# Patient Record
Sex: FEMALE | Race: WHITE | NOT HISPANIC OR LATINO
[De-identification: names, ages, dates, MRNs, and addresses within clinical notes are randomized per-mention and may not be internally consistent; named-entity substitution may affect disease eponyms.]

---

## 2017-04-11 ENCOUNTER — APPOINTMENT (OUTPATIENT)
Dept: ORTHOPEDIC SURGERY | Facility: CLINIC | Age: 77
End: 2017-04-11

## 2017-04-11 VITALS — WEIGHT: 170 LBS | HEIGHT: 64 IN | BODY MASS INDEX: 29.02 KG/M2

## 2017-04-20 RX ORDER — HYALURONATE SOD, CROSS-LINKED 30 MG/3 ML
30 SYRINGE (ML) INTRAARTICULAR
Refills: 0 | Status: COMPLETED | OUTPATIENT
Start: 2017-04-20

## 2017-04-20 RX ADMIN — Medication 0 MG/3ML: at 00:00

## 2017-12-18 ENCOUNTER — APPOINTMENT (OUTPATIENT)
Dept: DERMATOLOGY | Facility: CLINIC | Age: 77
End: 2017-12-18
Payer: MEDICARE

## 2017-12-18 DIAGNOSIS — Z85.828 PERSONAL HISTORY OF OTHER MALIGNANT NEOPLASM OF SKIN: ICD-10-CM

## 2017-12-18 PROCEDURE — 99213 OFFICE O/P EST LOW 20 MIN: CPT | Mod: 25

## 2017-12-18 PROCEDURE — 11100 BX SKIN SUBCUTANEOUS&/MUCOUS MEMBRANE 1 LESION: CPT | Mod: 59

## 2017-12-18 PROCEDURE — 17110 DESTRUCTION B9 LES UP TO 14: CPT

## 2018-01-03 LAB — CORE LAB BIOPSY: NORMAL

## 2018-02-22 ENCOUNTER — APPOINTMENT (OUTPATIENT)
Dept: DERMATOLOGY | Facility: CLINIC | Age: 78
End: 2018-02-22
Payer: MEDICARE

## 2018-02-22 PROCEDURE — 12032 INTMD RPR S/A/T/EXT 2.6-7.5: CPT

## 2018-02-22 PROCEDURE — 11603 EXC TR-EXT MAL+MARG 2.1-3 CM: CPT

## 2018-02-28 LAB — CORE LAB BIOPSY: NORMAL

## 2018-03-05 ENCOUNTER — APPOINTMENT (OUTPATIENT)
Dept: DERMATOLOGY | Facility: CLINIC | Age: 78
End: 2018-03-05
Payer: MEDICARE

## 2018-03-05 DIAGNOSIS — L85.8 OTHER SPECIFIED EPIDERMAL THICKENING: ICD-10-CM

## 2018-03-05 PROCEDURE — 99212 OFFICE O/P EST SF 10 MIN: CPT

## 2018-03-05 RX ORDER — NITROFURANTOIN MACROCRYSTALS 100 MG/1
100 CAPSULE ORAL
Qty: 6 | Refills: 0 | Status: COMPLETED | COMMUNITY
Start: 2017-09-20

## 2018-03-05 RX ORDER — NITROFURANTOIN (MONOHYDRATE/MACROCRYSTALS) 25; 75 MG/1; MG/1
100 CAPSULE ORAL
Qty: 20 | Refills: 0 | Status: DISCONTINUED | COMMUNITY
Start: 2017-11-20

## 2018-03-05 RX ORDER — ERYTHROMYCIN 5 MG/G
5 OINTMENT OPHTHALMIC
Qty: 4 | Refills: 0 | Status: COMPLETED | COMMUNITY
Start: 2017-10-19

## 2018-04-05 ENCOUNTER — APPOINTMENT (OUTPATIENT)
Dept: DERMATOLOGY | Facility: CLINIC | Age: 78
End: 2018-04-05
Payer: MEDICARE

## 2018-04-05 DIAGNOSIS — T14.8XXA OTHER INJURY OF UNSPECIFIED BODY REGION, INITIAL ENCOUNTER: ICD-10-CM

## 2018-04-05 DIAGNOSIS — L02.91 CUTANEOUS ABSCESS, UNSPECIFIED: ICD-10-CM

## 2018-04-05 PROCEDURE — 99213 OFFICE O/P EST LOW 20 MIN: CPT

## 2018-04-05 RX ORDER — AZITHROMYCIN 250 MG/1
250 TABLET, FILM COATED ORAL
Qty: 6 | Refills: 0 | Status: COMPLETED | COMMUNITY
Start: 2018-03-25

## 2018-04-09 LAB — BACTERIA SPEC CULT: ABNORMAL

## 2018-09-13 ENCOUNTER — APPOINTMENT (OUTPATIENT)
Dept: DERMATOLOGY | Facility: CLINIC | Age: 78
End: 2018-09-13
Payer: MEDICARE

## 2018-09-13 VITALS — WEIGHT: 166 LBS | BODY MASS INDEX: 28.34 KG/M2 | HEIGHT: 64 IN

## 2018-09-13 DIAGNOSIS — Z85.89 PERSONAL HISTORY OF MALIGNANT NEOPLASM OF OTHER ORGANS AND SYSTEMS: ICD-10-CM

## 2018-09-13 DIAGNOSIS — D18.01 HEMANGIOMA OF SKIN AND SUBCUTANEOUS TISSUE: ICD-10-CM

## 2018-09-13 PROCEDURE — 99213 OFFICE O/P EST LOW 20 MIN: CPT

## 2018-09-13 PROCEDURE — D0051: CPT

## 2018-09-13 RX ORDER — NITROFURANTOIN (MONOHYDRATE/MACROCRYSTALS) 25; 75 MG/1; MG/1
100 CAPSULE ORAL
Qty: 14 | Refills: 0 | Status: COMPLETED | COMMUNITY
Start: 2018-08-04 | End: 2018-09-13

## 2019-03-18 ENCOUNTER — APPOINTMENT (OUTPATIENT)
Dept: DERMATOLOGY | Facility: CLINIC | Age: 79
End: 2019-03-18

## 2019-10-03 ENCOUNTER — APPOINTMENT (OUTPATIENT)
Dept: DERMATOLOGY | Facility: CLINIC | Age: 79
End: 2019-10-03
Payer: MEDICARE

## 2019-10-03 VITALS — BODY MASS INDEX: 28.51 KG/M2 | HEIGHT: 64 IN | WEIGHT: 167 LBS

## 2019-10-03 PROCEDURE — 17000 DESTRUCT PREMALG LESION: CPT

## 2019-10-03 PROCEDURE — 99213 OFFICE O/P EST LOW 20 MIN: CPT | Mod: 25

## 2019-10-03 PROCEDURE — 17003 DESTRUCT PREMALG LES 2-14: CPT

## 2020-05-06 ENCOUNTER — APPOINTMENT (OUTPATIENT)
Dept: DERMATOLOGY | Facility: CLINIC | Age: 80
End: 2020-05-06

## 2021-05-25 ENCOUNTER — APPOINTMENT (OUTPATIENT)
Dept: CARDIOLOGY | Facility: CLINIC | Age: 81
End: 2021-05-25
Payer: MEDICARE

## 2021-05-25 ENCOUNTER — NON-APPOINTMENT (OUTPATIENT)
Age: 81
End: 2021-05-25

## 2021-05-25 VITALS — DIASTOLIC BLOOD PRESSURE: 78 MMHG | HEART RATE: 66 BPM | SYSTOLIC BLOOD PRESSURE: 166 MMHG | RESPIRATION RATE: 18 BRPM

## 2021-05-25 LAB
INR PPP: 2 RATIO
POCT-PROTHROMBIN TIME: 23.5 SECS
QUALITY CONTROL: YES

## 2021-05-25 PROCEDURE — 85610 PROTHROMBIN TIME: CPT | Mod: QW

## 2021-05-25 PROCEDURE — 99213 OFFICE O/P EST LOW 20 MIN: CPT

## 2021-06-01 ENCOUNTER — APPOINTMENT (OUTPATIENT)
Dept: CARDIOLOGY | Facility: CLINIC | Age: 81
End: 2021-06-01
Payer: MEDICARE

## 2021-06-01 VITALS — HEART RATE: 75 BPM | RESPIRATION RATE: 18 BRPM | SYSTOLIC BLOOD PRESSURE: 160 MMHG | DIASTOLIC BLOOD PRESSURE: 66 MMHG

## 2021-06-01 LAB
INR PPP: 2.8 RATIO
POCT-PROTHROMBIN TIME: 33.7 SECS
QUALITY CONTROL: YES

## 2021-06-01 PROCEDURE — 93793 ANTICOAG MGMT PT WARFARIN: CPT

## 2021-06-01 PROCEDURE — 85610 PROTHROMBIN TIME: CPT | Mod: QW

## 2021-06-17 ENCOUNTER — APPOINTMENT (OUTPATIENT)
Dept: CARDIOLOGY | Facility: CLINIC | Age: 81
End: 2021-06-17
Payer: MEDICARE

## 2021-06-17 VITALS — HEART RATE: 76 BPM | DIASTOLIC BLOOD PRESSURE: 82 MMHG | SYSTOLIC BLOOD PRESSURE: 156 MMHG | RESPIRATION RATE: 18 BRPM

## 2021-06-17 LAB
INR PPP: 1.7 RATIO
POCT-PROTHROMBIN TIME: 20 SECS
QUALITY CONTROL: YES

## 2021-06-17 PROCEDURE — 93793 ANTICOAG MGMT PT WARFARIN: CPT

## 2021-06-17 PROCEDURE — 85610 PROTHROMBIN TIME: CPT | Mod: QW

## 2021-06-22 ENCOUNTER — APPOINTMENT (OUTPATIENT)
Dept: CARDIOLOGY | Facility: CLINIC | Age: 81
End: 2021-06-22
Payer: MEDICARE

## 2021-06-22 PROCEDURE — 85610 PROTHROMBIN TIME: CPT | Mod: QW

## 2021-06-22 PROCEDURE — 93793 ANTICOAG MGMT PT WARFARIN: CPT

## 2021-06-30 ENCOUNTER — APPOINTMENT (OUTPATIENT)
Dept: CARDIOLOGY | Facility: CLINIC | Age: 81
End: 2021-06-30
Payer: MEDICARE

## 2021-06-30 VITALS — RESPIRATION RATE: 18 BRPM | DIASTOLIC BLOOD PRESSURE: 69 MMHG | SYSTOLIC BLOOD PRESSURE: 151 MMHG | HEART RATE: 76 BPM

## 2021-06-30 LAB
INR PPP: 3.3 RATIO
POCT-PROTHROMBIN TIME: 40.2 SECS
QUALITY CONTROL: YES

## 2021-06-30 PROCEDURE — 93793 ANTICOAG MGMT PT WARFARIN: CPT

## 2021-06-30 PROCEDURE — 85610 PROTHROMBIN TIME: CPT | Mod: QW

## 2021-07-01 LAB
INR PPP: 2.4 RATIO
POCT-PROTHROMBIN TIME: 28.4 SECS
QUALITY CONTROL: YES

## 2021-07-09 ENCOUNTER — APPOINTMENT (OUTPATIENT)
Dept: CARDIOLOGY | Facility: CLINIC | Age: 81
End: 2021-07-09
Payer: MEDICARE

## 2021-07-09 VITALS — RESPIRATION RATE: 18 BRPM | SYSTOLIC BLOOD PRESSURE: 132 MMHG | HEART RATE: 68 BPM | DIASTOLIC BLOOD PRESSURE: 77 MMHG

## 2021-07-09 LAB
INR PPP: 3.8 RATIO
POCT-PROTHROMBIN TIME: 45.8 SECS
QUALITY CONTROL: YES

## 2021-07-09 PROCEDURE — 93793 ANTICOAG MGMT PT WARFARIN: CPT

## 2021-07-09 PROCEDURE — 85610 PROTHROMBIN TIME: CPT | Mod: QW

## 2021-07-16 ENCOUNTER — APPOINTMENT (OUTPATIENT)
Dept: CARDIOLOGY | Facility: CLINIC | Age: 81
End: 2021-07-16
Payer: MEDICARE

## 2021-07-16 VITALS — DIASTOLIC BLOOD PRESSURE: 69 MMHG | SYSTOLIC BLOOD PRESSURE: 154 MMHG | HEART RATE: 70 BPM | RESPIRATION RATE: 18 BRPM

## 2021-07-16 PROCEDURE — 85610 PROTHROMBIN TIME: CPT | Mod: QW

## 2021-07-16 PROCEDURE — 93793 ANTICOAG MGMT PT WARFARIN: CPT

## 2021-07-19 LAB
INR PPP: 3.5 RATIO
POCT-PROTHROMBIN TIME: 38.5 SECS
QUALITY CONTROL: YES

## 2021-07-23 ENCOUNTER — APPOINTMENT (OUTPATIENT)
Dept: CARDIOLOGY | Facility: CLINIC | Age: 81
End: 2021-07-23
Payer: MEDICARE

## 2021-07-23 VITALS — HEART RATE: 67 BPM | DIASTOLIC BLOOD PRESSURE: 68 MMHG | RESPIRATION RATE: 18 BRPM | SYSTOLIC BLOOD PRESSURE: 142 MMHG

## 2021-07-23 LAB
INR PPP: 2.3 RATIO
POCT-PROTHROMBIN TIME: 27 SECS
QUALITY CONTROL: YES

## 2021-07-23 PROCEDURE — 93793 ANTICOAG MGMT PT WARFARIN: CPT

## 2021-07-23 PROCEDURE — 85610 PROTHROMBIN TIME: CPT | Mod: QW

## 2021-08-03 ENCOUNTER — APPOINTMENT (OUTPATIENT)
Dept: CARDIOLOGY | Facility: CLINIC | Age: 81
End: 2021-08-03
Payer: MEDICARE

## 2021-08-03 LAB
INR PPP: 2.8 RATIO
POCT-PROTHROMBIN TIME: 33.8 SECS
QUALITY CONTROL: YES

## 2021-08-03 PROCEDURE — 93793 ANTICOAG MGMT PT WARFARIN: CPT

## 2021-08-03 PROCEDURE — 85610 PROTHROMBIN TIME: CPT | Mod: QW

## 2021-08-24 ENCOUNTER — APPOINTMENT (OUTPATIENT)
Dept: CARDIOLOGY | Facility: CLINIC | Age: 81
End: 2021-08-24
Payer: MEDICARE

## 2021-08-24 VITALS — RESPIRATION RATE: 18 BRPM | HEART RATE: 65 BPM | DIASTOLIC BLOOD PRESSURE: 67 MMHG | SYSTOLIC BLOOD PRESSURE: 149 MMHG

## 2021-08-24 LAB
INR PPP: 2.4 RATIO
POCT-PROTHROMBIN TIME: 28.3 SECS
QUALITY CONTROL: YES

## 2021-08-24 PROCEDURE — 93793 ANTICOAG MGMT PT WARFARIN: CPT

## 2021-08-24 PROCEDURE — 85610 PROTHROMBIN TIME: CPT | Mod: QW

## 2021-09-14 ENCOUNTER — APPOINTMENT (OUTPATIENT)
Dept: CARDIOLOGY | Facility: CLINIC | Age: 81
End: 2021-09-14
Payer: MEDICARE

## 2021-09-14 VITALS — DIASTOLIC BLOOD PRESSURE: 60 MMHG | SYSTOLIC BLOOD PRESSURE: 149 MMHG | HEART RATE: 60 BPM | RESPIRATION RATE: 18 BRPM

## 2021-09-14 PROCEDURE — 85610 PROTHROMBIN TIME: CPT | Mod: QW

## 2021-09-14 PROCEDURE — 93793 ANTICOAG MGMT PT WARFARIN: CPT

## 2021-09-18 LAB
INR PPP: 2.2 RATIO
POCT-PROTHROMBIN TIME: 26 SECS
QUALITY CONTROL: YES

## 2021-10-05 ENCOUNTER — APPOINTMENT (OUTPATIENT)
Dept: CARDIOLOGY | Facility: CLINIC | Age: 81
End: 2021-10-05
Payer: MEDICARE

## 2021-10-05 VITALS — DIASTOLIC BLOOD PRESSURE: 66 MMHG | SYSTOLIC BLOOD PRESSURE: 155 MMHG | HEART RATE: 75 BPM | RESPIRATION RATE: 18 BRPM

## 2021-10-05 LAB
INR PPP: 3.1 RATIO
POCT-PROTHROMBIN TIME: 37.2 SECS
QUALITY CONTROL: YES

## 2021-10-05 PROCEDURE — 93793 ANTICOAG MGMT PT WARFARIN: CPT

## 2021-10-05 PROCEDURE — 85610 PROTHROMBIN TIME: CPT | Mod: QW

## 2021-10-17 ENCOUNTER — TRANSCRIPTION ENCOUNTER (OUTPATIENT)
Age: 81
End: 2021-10-17

## 2021-10-18 ENCOUNTER — NON-APPOINTMENT (OUTPATIENT)
Age: 81
End: 2021-10-18

## 2021-10-18 ENCOUNTER — APPOINTMENT (OUTPATIENT)
Dept: ORTHOPEDIC SURGERY | Facility: CLINIC | Age: 81
End: 2021-10-18
Payer: MEDICARE

## 2021-10-18 ENCOUNTER — EMERGENCY (EMERGENCY)
Facility: HOSPITAL | Age: 81
LOS: 0 days | Discharge: HOME | End: 2021-10-18
Attending: EMERGENCY MEDICINE
Payer: MEDICARE

## 2021-10-18 VITALS — WEIGHT: 166.01 LBS | HEIGHT: 64 IN

## 2021-10-18 VITALS
HEART RATE: 78 BPM | RESPIRATION RATE: 19 BRPM | SYSTOLIC BLOOD PRESSURE: 173 MMHG | OXYGEN SATURATION: 100 % | TEMPERATURE: 99 F | DIASTOLIC BLOOD PRESSURE: 79 MMHG

## 2021-10-18 DIAGNOSIS — Z88.8 ALLERGY STATUS TO OTHER DRUGS, MEDICAMENTS AND BIOLOGICAL SUBSTANCES: ICD-10-CM

## 2021-10-18 DIAGNOSIS — S82.841A DISPLACED BIMALLEOLAR FRACTURE OF RIGHT LOWER LEG, INITIAL ENCOUNTER FOR CLOSED FRACTURE: ICD-10-CM

## 2021-10-18 DIAGNOSIS — Z91.040 LATEX ALLERGY STATUS: ICD-10-CM
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
## 2021-10-18 DIAGNOSIS — M79.671 PAIN IN RIGHT FOOT: ICD-10-CM
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
## 2021-10-18 PROCEDURE — 76376 3D RENDER W/INTRP POSTPROCES: CPT

## 2021-10-18 PROCEDURE — 29515 APPLICATION SHORT LEG SPLINT: CPT

## 2021-10-18 PROCEDURE — 73700 CT LOWER EXTREMITY W/O DYE: CPT | Mod: 26,RT,MG

## 2021-10-18 PROCEDURE — 27810 TREATMENT OF ANKLE FRACTURE: CPT | Mod: RT

## 2021-10-18 PROCEDURE — 73610 X-RAY EXAM OF ANKLE: CPT | Mod: 26,RT

## 2021-10-18 PROCEDURE — G1004: CPT

## 2021-10-18 PROCEDURE — 73700 CT LOWER EXTREMITY W/O DYE: CPT | Mod: MG,RT

## 2021-10-18 PROCEDURE — 99282 EMERGENCY DEPT VISIT SF MDM: CPT | Mod: 25

## 2021-10-18 PROCEDURE — 73590 X-RAY EXAM OF LOWER LEG: CPT | Mod: 26,RT

## 2021-10-18 PROCEDURE — 73590 X-RAY EXAM OF LOWER LEG: CPT | Mod: RT

## 2021-10-18 PROCEDURE — 76376 3D RENDER W/INTRP POSTPROCES: CPT | Mod: 26

## 2021-10-18 PROCEDURE — 73610 X-RAY EXAM OF ANKLE: CPT | Mod: RT

## 2021-10-18 PROCEDURE — 73600 X-RAY EXAM OF ANKLE: CPT | Mod: 26,59,76,RT

## 2021-10-18 PROCEDURE — 99204 OFFICE O/P NEW MOD 45 MIN: CPT

## 2021-10-18 PROCEDURE — 73600 X-RAY EXAM OF ANKLE: CPT | Mod: RT

## 2021-10-18 PROCEDURE — 99284 EMERGENCY DEPT VISIT MOD MDM: CPT

## 2021-10-18 PROCEDURE — 99285 EMERGENCY DEPT VISIT HI MDM: CPT | Mod: 25

## 2021-10-18 NOTE — PHYSICAL EXAM
[de-identified] : Right ankle physical exam:\par \par Very limited right ankle physical exam.  I made an anterior window through the ankle splint.  No fracture blisters present.  Ankle swelling noted.  Pain on the medial and lateral malleoli.  Dorsalis pedis pulses normal.  Patient able to wiggle their toes.  Patient has no numbness or sensation deficits within the foot. [de-identified] : X-rays of the right ankle taken yesterday at the [facility] urgent care and reviewed: Displaced bimalleolar fracture.

## 2021-10-18 NOTE — HISTORY OF PRESENT ILLNESS
[FreeTextEntry1] : 10/18/2021: The patient is a 81-year-old female who presents in office with a right ankle bimalleolar fracture diagnosed yesterday at the [facility] urgent care.  She presents with a posterior ankle splint in a wheelchair nonweightbearing.  She does have a blood clotting disorder and is currently on warfarin daily.  Her pain scale is 5 out of 10.  She denies fevers, chills, night sweats, shortness of breath.  She has no other complaints at this time.

## 2021-10-18 NOTE — ED ADULT NURSE REASSESSMENT NOTE - NS ED NURSE REASSESS COMMENT FT1
pt refused for us to take the bp prior to dc, "I know my bp is high and I just want to go home, and I have everything at home from crutches, walker, etc"  assisted pt on WC to her car.

## 2021-10-18 NOTE — ED STATDOCS - PROGRESS NOTE DETAILS
ortho residents aware will come see pt   Lakisha Rutherford PA-C pt seen and evaluated by ortho, fx reduced, per ortho pt stable for d/c home call Dr. Montilla's office tmrw for f/u appt  Lakisha Rutherford PA-C pt nvi.  well appearing.  D/c home with strict return precautions and prompt outpatient f/u.

## 2021-10-18 NOTE — ED STATDOCS - PATIENT PORTAL LINK FT
You can access the FollowMyHealth Patient Portal offered by [facility] by registering at the following website: [email or web address]. By joining [employer]’s FollowMyHealth portal, you will also be able to view your health information using other applications (apps) compatible with our system.

## 2021-10-18 NOTE — ED STATDOCS - CARE PROVIDER_API CALL
Ricardo Montilla (DO)  Orthopaedic Surgery  [address] [place] [address]  Phone: [phone]  Fax: [phone]  Follow Up Time:

## 2021-10-18 NOTE — ED STATDOCS - NSFOLLOWUPINSTRUCTIONS_ED_ALL_ED_FT
Call Dr. Montilla's office tomorrow to make an appointment       Ankle Fracture    An ankle fracture is a break in one, two, or all three of the bones in your ankle joint. The ankle joint is made up of:  •The lower parts of your tibia and fibula. These are your lower leg bones.      •A bone in the foot called the talus.        What are the causes?    •A hard, direct hit to the ankle.      •Twisting your ankle fast and very badly.      •Getting injured in a car crash or from a fall from high up.        What increases the risk?    •Being overweight.      •Doing certain sports, such as soccer, gymnastics, or football.        What are the signs or symptoms?     •A tender and swollen ankle.      •Bruising around your ankle.      •Pain when you move or press on your ankle.      •Trouble walking.      •Trouble using your ankle to support your body weight (putting weight on your ankle).    •Pain that gets worse:   •When you move your ankle or foot.      •When you stand.        •Pain that gets better with rest.        How is this treated?  This condition may be treated with:  •A cast, boot, or splint.      •Crutches.       •Surgery.      •Staying off your injured foot.      •Exercises to make your ankle move better and get stronger.        Follow these instructions at home:    Your doctor may tell you to do these things:    If you have a boot or splint:     •Wear the boot or splint as told by your doctor. Take it off only as told by your doctor.    •Loosen it if your toes:  •Tingle.      •Lose feeling (get numb).      •Turn cold and blue.        •Keep it clean and dry.      If you have a cast:     • Do not put pressure on any part of the cast until it is fully hardened.      • Do not stick anything inside the cast to scratch your skin.      •Check the skin around the cast every day. Tell your doctor if you see problems.      •You may put lotion on dry skin around the cast. Do not put lotion on the skin under the cast.      •Keep it clean and dry.      Bathing     • Do not take baths, swim, or use a hot tub. Ask your doctor about taking showers or sponge baths.    •If the cast, splint, or boot is not waterproof:  •Do not let it get wet.      •Cover it with a watertight covering when you take a bath or shower.          Managing pain, stiffness, and swelling    •If told, put ice on the injured area. To do this:  •If you have a removable splint or boot, take it off as told by your doctor.      •Put ice in a plastic bag.      •Place a towel between your skin and the bag or between your cast and the bag.      •Leave the ice on for 20 minutes, 2–3 times a day.      •Take off the ice if your skin turns bright red. This is very important. If you cannot feel pain, heat, or cold, you have a greater risk of damage to the area.        •Move your toes often.      •Raise the injured area above the level of your heart while you are sitting or lying down.      Activity     •Do exercises as told by your doctor.      •Return to your normal activities when your doctor says that it is safe.      •Use crutches to support your body weight. Do not use your injured leg to support your body weight until your doctor says that you can.      General instructions     •Take over-the-counter and prescription medicines only as told by your doctor.      •Ask your doctor when it is safe to drive if you have a cast, boot, or splint on your leg.      • Do not smoke or use any products that contain nicotine or tobacco. These can delay bone healing. If you need help quitting, ask your doctor.      •Keep all follow-up visits.        Contact a doctor if:    •Your pain or swelling gets worse.      •Your pain or swelling does not get better with rest or medicine.      •Your cast gets damaged.        Get help right away if:    •You have very bad pain that lasts.      •You get new pain or swelling.    •Your skin or toes below the injured ankle:  •Turn blue or gray.      •Feel cold.      •Lose feeling.      •Have less feeling than normal when something touches them.          Summary    •An ankle fracture is a break in one, two, or three bones in your lower leg and foot.      •An ankle fracture may be treated with a cast, a boot, a splint, or surgery.      • Do not use your injured leg to support your body weight until your doctor says that you can.      •Use ice, take medicines, and raise your foot as told. Do not smoke or use products that contain nicotine or tobacco.      This information is not intended to replace advice given to you by your health care provider. Make sure you discuss any questions you have with your health care provider.

## 2021-10-18 NOTE — ED STATDOCS - OBJECTIVE STATEMENT
82 y/o F with no significant PMHx presents to the ED for alignment with ETHAN Ramos. States she was hiking yesterday and broke her R foot. No head strike. No LOC. No other injuries. Dr. Montilla wants a CT of the foot.

## 2021-10-18 NOTE — CONSULT NOTE ADULT - SUBJECTIVE AND OBJECTIVE BOX
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 She tripped walking up a hill in the [place] and twisted her ankle. . Pt had immediate pain to the affected ankle and was not able to bear weight. She was carried out by her friends and taken to Urgent care where she was splinted yesterday. Today she followed up w DR Montilla in the office who reviewed her films today and she was directed to come to the ED for new splint, reduction and CT for surgical planning. No other injury. Community ambulator no assistive devices Denies HS/LOC. Denies numbness/tingling. No other pain/injuries. Denies fevers/chills. Pt seen and examined along with Orthopedic PA and Resident Team on behalf DR Montilla    HEALTH ISSUES - PROBLEM Dx:        MEDICATIONS  (STANDING):    Allergies    Compazine (Unknown)  latex (Unknown)  Lovenox (Unknown)    Intolerances      Imaging: XR demonstrates Rignt ankle fracture bimalleolar            Vital Signs Last 24 Hrs  T(C): 37 (10-18-21 @ 17:40), Max: 37 (10-18-21 @ 17:40)  T(F): 98.6 (10-18-21 @ 17:40), Max: 98.6 (10-18-21 @ 17:40)  HR: 78 (10-18-21 @ 17:40) (78 - 78)  BP: 173/79 (10-18-21 @ 17:40) (173/79 - 173/79)  BP(mean): 107 (10-18-21 @ 17:40) (107 - 107)  RR: 19 (10-18-21 @ 17:40) (19 - 19)  SpO2: 100% (10-18-21 @ 17:40) (100% - 100%)    Physical Exam  Gen: Nad, Alert, oriented, Follows Commands calm and pleasant healthy looking older woman    RIGHT LE: Splint taken down. No open skin. No blisters. +swelling and mild ecchymosis of ankle. +DP pulse and sensation intact to light touch. No other signs of trauma at hip, thigh, knee, leg, or foot/toes. No bony TTP to hip, knee or ankle.  neg log roll. + DP/PT pulses palpable with brisk cap refill distally. Compartments soft and compressible of lower leg.     LEFT LE:  Full baseline painless ROM at hip, knee, ankle and toe with negative log-roll and heel strike. Knee Extensor mechanism intact. Able to actively SLR. SILT toes 1-5. No bony TTP to hip, knee or ankle. Plantar dorsiflexion 5/5. No ankle instability. No pain on Axial loading of leg.    RIGHT UE: No obvious deformities or other signs of trauma at shoulder, upper arm, elbow, forearm, wrist or hand/digits. Full baseline painless ROM at shoulder, elbow, wrist, and . No bony TTP.  Strength 5/5.    LEFT UE: No obvious deformities or other signs of trauma at shoulder, upper arm, elbow, forearm, wrist or hand/digits.  Full baseline painless ROM at shoulder, elbow, wrist, and . No bony TTP.  Strength 5/5.          Procedure: By Resident: Pt gave verbal consent to procedure. Risks including pain from procedure, and benefits to relieve pressure on vessels, soft tissue, nerves, and prevent cartilage damage were discussed. Then  10 cc 1% lidocaine injected under sterile procedure into R ankle joint. Time was allowed to achieve anesthetic effect. The leg was held. Closed reduction performed. Placed in well padded trilam splint. Post procedure imaging ordered. Post procedure exam unchanged, NV intact, able to move all toes, SILT distally.     A/P: 81y Female with Left vs Right ankle fracture  FU XRAYS POST REDUCTION  CT  Pain control  NWB RLE in splint, keep c/d/I, crutches/walker   Ice/elevation  Si/sx compartment syndrome discussed with patient, told to return to ED if exhibit any  Pt is aware of need for surgical intervention which she discussed w DR Montilla  Follow up with Dr. Montilla within 1 week, call office for appointment  Ortho stable for DC after CT and XR  Discussed with Ortho Attending

## 2021-10-18 NOTE — DISCUSSION/SUMMARY
[de-identified] : Today I had a lengthy discussion with the patient regarding their left ankle, Bimalleolar fracture. I have addressed all the patient's concerns surrounding the pathology of their condition. XR imaging results were reviewed with the patient.\par \par At this time, I advised that the patient travel to the Emergency Room for her left ankle. At the ED, I recommended that the patient's splint be adjusted. \par \par  A lengthy discussion was had about the surgery. All risks, benefits and alternatives to the recommended surgical procedure were discussed which include but are not limited to bleeding, infection, nerve damage, vascular damage, failure of the wound to heal, the need for further surgery, loss of limb, DVT, PE, loss of life as well as the risks associated with general anesthesia. The patient verbalized understanding and provided informed consent to move forward with surgery.	\par \par The patient understood and verbally agreed to the treatment plan. All of their questions were answered and they were satisfied with the visit. The patient should call the office if they have any questions or experience worsening symptoms.\par \par Coumadin bridging for preop planning\par Surgery advised\par Planning indicated\par Medical optimization needed and hematology clearance appreciated.

## 2021-10-18 NOTE — ED STATDOCS - CLINICAL SUMMARY MEDICAL DECISION MAKING FREE TEXT BOX
pt to have reduction here with more definitive splint placement and to get post reduction CT. discussed with orthopedics who will evaluate post x ray. anticipate dc home with return precautions.

## 2021-10-19 DIAGNOSIS — Z01.818 ENCOUNTER FOR OTHER PREPROCEDURAL EXAMINATION: ICD-10-CM

## 2021-10-23 ENCOUNTER — APPOINTMENT (OUTPATIENT)
Dept: DISASTER EMERGENCY | Facility: CLINIC | Age: 81
End: 2021-10-23

## 2021-10-24 LAB — SARS-COV-2 N GENE NPH QL NAA+PROBE: NOT DETECTED

## 2021-10-25 RX ORDER — CHOLESTYRAMINE 4 G/9G
1 POWDER, FOR SUSPENSION ORAL
Qty: 0 | Refills: 0 | DISCHARGE

## 2021-10-25 RX ORDER — SOLIFENACIN SUCCINATE 10 MG/1
1 TABLET ORAL
Qty: 0 | Refills: 0 | DISCHARGE

## 2021-10-25 RX ORDER — FEXOFENADINE HCL 30 MG
1 TABLET ORAL
Qty: 0 | Refills: 0 | DISCHARGE

## 2021-10-25 RX ORDER — AMITRIPTYLINE HCL 25 MG
1 TABLET ORAL
Qty: 0 | Refills: 0 | DISCHARGE

## 2021-10-25 RX ORDER — ASCORBIC ACID 60 MG
1 TABLET,CHEWABLE ORAL
Qty: 0 | Refills: 0 | DISCHARGE

## 2021-10-25 RX ORDER — CHOLECALCIFEROL (VITAMIN D3) 125 MCG
1 CAPSULE ORAL
Qty: 0 | Refills: 0 | DISCHARGE

## 2021-10-25 NOTE — ASU PATIENT PROFILE, ADULT - NSICDXPASTMEDICALHX_GEN_ALL_CORE_FT
PAST MEDICAL HISTORY:  Anticardiolipin antibody syndrome     Anticardiolipin syndrome     Hyperlipidemia     No pertinent past medical history     Occlusive thrombus

## 2021-10-26 ENCOUNTER — APPOINTMENT (OUTPATIENT)
Dept: CARDIOLOGY | Facility: CLINIC | Age: 81
End: 2021-10-26

## 2021-10-26 ENCOUNTER — OUTPATIENT (OUTPATIENT)
Dept: INPATIENT UNIT | Facility: HOSPITAL | Age: 81
LOS: 1 days | Discharge: HOME | End: 2021-10-26
Payer: MEDICARE

## 2021-10-26 ENCOUNTER — APPOINTMENT (OUTPATIENT)
Dept: ORTHOPEDIC SURGERY | Facility: HOSPITAL | Age: 81
End: 2021-10-26

## 2021-10-26 VITALS
TEMPERATURE: 97 F | DIASTOLIC BLOOD PRESSURE: 76 MMHG | SYSTOLIC BLOOD PRESSURE: 152 MMHG | HEART RATE: 80 BPM | WEIGHT: 166.01 LBS | RESPIRATION RATE: 16 BRPM | HEIGHT: 64 IN | OXYGEN SATURATION: 100 %

## 2021-10-26 VITALS
OXYGEN SATURATION: 100 % | RESPIRATION RATE: 15 BRPM | HEART RATE: 84 BPM | TEMPERATURE: 98 F | DIASTOLIC BLOOD PRESSURE: 62 MMHG | SYSTOLIC BLOOD PRESSURE: 148 MMHG

## 2021-10-26 DIAGNOSIS — S82.841A DISPLACED BIMALLEOLAR FRACTURE OF RIGHT LOWER LEG, INITIAL ENCOUNTER FOR CLOSED FRACTURE: ICD-10-CM

## 2021-10-26 DIAGNOSIS — M17.11 UNILATERAL PRIMARY OSTEOARTHRITIS, RIGHT KNEE: ICD-10-CM

## 2021-10-26 DIAGNOSIS — Z86.718 PERSONAL HISTORY OF OTHER VENOUS THROMBOSIS AND EMBOLISM: ICD-10-CM

## 2021-10-26 DIAGNOSIS — Z88.2 ALLERGY STATUS TO SULFONAMIDES: ICD-10-CM

## 2021-10-26 DIAGNOSIS — Z85.828 PERSONAL HISTORY OF OTHER MALIGNANT NEOPLASM OF SKIN: ICD-10-CM

## 2021-10-26 DIAGNOSIS — Z91.040 LATEX ALLERGY STATUS: ICD-10-CM
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
## 2021-10-26 DIAGNOSIS — L02.91 CUTANEOUS ABSCESS, UNSPECIFIED: ICD-10-CM

## 2021-10-26 DIAGNOSIS — D18.01 HEMANGIOMA OF SKIN AND SUBCUTANEOUS TISSUE: ICD-10-CM

## 2021-10-26 DIAGNOSIS — Z80.3 FAMILY HISTORY OF MALIGNANT NEOPLASM OF BREAST: ICD-10-CM

## 2021-10-26 DIAGNOSIS — L57.0 ACTINIC KERATOSIS: ICD-10-CM

## 2021-10-26 DIAGNOSIS — Z79.01 LONG TERM (CURRENT) USE OF ANTICOAGULANTS: ICD-10-CM

## 2021-10-26 DIAGNOSIS — Z90.81 ACQUIRED ABSENCE OF SPLEEN: Chronic | ICD-10-CM

## 2021-10-26 DIAGNOSIS — S82.851A DISPLACED TRIMALLEOLAR FRACTURE OF RIGHT LOWER LEG, INITIAL ENCOUNTER FOR CLOSED FRACTURE: ICD-10-CM

## 2021-10-26 DIAGNOSIS — L81.4 OTHER MELANIN HYPERPIGMENTATION: ICD-10-CM

## 2021-10-26 DIAGNOSIS — R76.0 RAISED ANTIBODY TITER: ICD-10-CM
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
## 2021-10-26 DIAGNOSIS — E78.5 HYPERLIPIDEMIA, UNSPECIFIED: ICD-10-CM

## 2021-10-26 DIAGNOSIS — Z90.49 ACQUIRED ABSENCE OF OTHER SPECIFIED PARTS OF DIGESTIVE TRACT: Chronic | ICD-10-CM

## 2021-10-26 PROCEDURE — 27829 TREAT LOWER LEG JOINT: CPT | Mod: RT

## 2021-10-26 PROCEDURE — 76000 FLUOROSCOPY <1 HR PHYS/QHP: CPT | Mod: 26

## 2021-10-26 PROCEDURE — C1713: CPT

## 2021-10-26 PROCEDURE — 27822 TREATMENT OF ANKLE FRACTURE: CPT | Mod: RT

## 2021-10-26 PROCEDURE — 76000 FLUOROSCOPY <1 HR PHYS/QHP: CPT

## 2021-10-26 RX ORDER — SODIUM CHLORIDE 9 MG/ML
1000 INJECTION, SOLUTION INTRAVENOUS
Refills: 0 | Status: DISCONTINUED | OUTPATIENT
Start: 2021-10-26 | End: 2021-10-26

## 2021-10-26 RX ORDER — ONDANSETRON 8 MG/1
4 TABLET, FILM COATED ORAL ONCE
Refills: 0 | Status: COMPLETED | OUTPATIENT
Start: 2021-10-26 | End: 2021-10-26

## 2021-10-26 RX ORDER — OXYCODONE HYDROCHLORIDE 5 MG/1
10 TABLET ORAL ONCE
Refills: 0 | Status: DISCONTINUED | OUTPATIENT
Start: 2021-10-26 | End: 2021-10-26

## 2021-10-26 RX ORDER — DOCUSATE SODIUM 100 MG
1 CAPSULE ORAL
Qty: 28 | Refills: 0
Start: 2021-10-26 | End: 2021-11-08

## 2021-10-26 RX ORDER — OXYCODONE HYDROCHLORIDE 5 MG/1
5 TABLET ORAL ONCE
Refills: 0 | Status: DISCONTINUED | OUTPATIENT
Start: 2021-10-26 | End: 2021-10-26

## 2021-10-26 RX ORDER — DIPHENHYDRAMINE HCL 50 MG
12.5 CAPSULE ORAL ONCE
Refills: 0 | Status: COMPLETED | OUTPATIENT
Start: 2021-10-26 | End: 2021-10-26

## 2021-10-26 RX ORDER — WARFARIN SODIUM 2.5 MG/1
1 TABLET ORAL
Qty: 0 | Refills: 0 | DISCHARGE

## 2021-10-26 RX ORDER — RIVAROXABAN 15 MG-20MG
1 KIT ORAL
Qty: 0 | Refills: 0 | DISCHARGE

## 2021-10-26 RX ORDER — FENTANYL CITRATE 50 UG/ML
50 INJECTION INTRAVENOUS
Refills: 0 | Status: DISCONTINUED | OUTPATIENT
Start: 2021-10-26 | End: 2021-10-26

## 2021-10-26 RX ORDER — ONDANSETRON 8 MG/1
1 TABLET, FILM COATED ORAL
Qty: 56 | Refills: 0
Start: 2021-10-26 | End: 2021-11-08

## 2021-10-26 RX ADMIN — OXYCODONE HYDROCHLORIDE 10 MILLIGRAM(S): 5 TABLET ORAL at 16:20

## 2021-10-26 RX ADMIN — Medication 12.5 MILLIGRAM(S): at 16:20

## 2021-10-26 RX ADMIN — ONDANSETRON 4 MILLIGRAM(S): 8 TABLET, FILM COATED ORAL at 16:05

## 2021-10-26 NOTE — ASU DISCHARGE PLAN (ADULT/PEDIATRIC) - CALL YOUR DOCTOR IF YOU HAVE ANY OF THE FOLLOWING:
Pain not relieved by Medications/Wound/Surgical Site with redness, or foul smelling discharge or pus/Numbness, tingling, color or temperature change to extremity

## 2021-10-26 NOTE — BRIEF OPERATIVE NOTE - NSICDXBRIEFPROCEDURE_GEN_ALL_CORE_FT
PROCEDURES:  Open reduction of trimalleolar fracture of right ankle 26-Oct-2021 15:43:00 ORIF of Rt trimalleolar ankle Fx Rafael Mckay

## 2021-10-26 NOTE — ASU DISCHARGE PLAN (ADULT/PEDIATRIC) - CARE PROVIDER_API CALL
Ricardo Montilla (DO)  Orthopaedic Surgery  [address] [place] [address]  Phone: [phone]  Fax: [phone]  Follow Up Time:

## 2021-10-26 NOTE — ASU DISCHARGE PLAN (ADULT/PEDIATRIC) - ASU DC SPECIAL INSTRUCTIONSFT
Follow up with Dr. Montilla in 7-10 days. Call office for appointment. Take medications as prescribed. Keep dressing clean, dry, and intact. Rest, ice, and elevate affected extremity. Nonweightbearing right lower extremity in splint. Take xarelto 10mg as instructed and follow up with hematologist for transition back to Coumadin.

## 2021-11-10 ENCOUNTER — APPOINTMENT (OUTPATIENT)
Dept: ORTHOPEDIC SURGERY | Facility: CLINIC | Age: 81
End: 2021-11-10
Payer: MEDICARE

## 2021-11-10 PROBLEM — D68.61 ANTIPHOSPHOLIPID SYNDROME: Chronic | Status: ACTIVE | Noted: 2021-10-25

## 2021-11-10 PROBLEM — I82.90 ACUTE EMBOLISM AND THROMBOSIS OF UNSPECIFIED VEIN: Chronic | Status: ACTIVE | Noted: 2021-10-25

## 2021-11-10 PROBLEM — E78.5 HYPERLIPIDEMIA, UNSPECIFIED: Chronic | Status: ACTIVE | Noted: 2021-10-25

## 2021-11-10 PROCEDURE — 99024 POSTOP FOLLOW-UP VISIT: CPT

## 2021-11-10 PROCEDURE — 29405 APPL SHORT LEG CAST: CPT | Mod: 58,RT

## 2021-11-10 NOTE — PROCEDURE
[de-identified] : A well-padded short leg fiberglass cast was applied to the right ankle. The cast was positioned appropriately to ensure neutral hindfoot alignment. Cast instructions explained to the patient. All questions answered. The patient has expressed acceptance and understanding\par

## 2021-11-10 NOTE — ADDENDUM
[FreeTextEntry1] : I, Angelina Vargas, acted solely as a scribe for Dr. Ricardo Montilla on this date 11/10/2021.\par \par All medical record entries made by the Scribe were at my, Dr. Ricardo Montilla, direction and personally dictated by me on 11/10/2021 . I have reviewed the chart and agree that the record accurately reflects my personal performance of the history, physical exam, assessment and plan. I have also personally directed, reviewed, and agreed with the chart.	\par

## 2021-11-10 NOTE — HISTORY OF PRESENT ILLNESS
[___ Weeks Post Op] : [unfilled] weeks post op [Erythema] : erythematous [Swelling] : swollen [Doing Well] : is doing well [Excellent Pain Control] : has excellent pain control [No Sign of Infection] : is showing no signs of infection [Chills] : no chills [Fever] : no fever [Nausea] : no nausea [Vomiting] : no vomiting [Healed] : not healed [Discharge] : absent of discharge [Dehiscence] : not dehisced [Sutures Removed] : sutures were not removed [Steri-Strips Removed & Replaced] : steri-strips not removed [de-identified] : s/p surgical fixation of the right ankle trimalleolar fracture\par DOS: 10/26/2021 [de-identified] : 81 year old female presenting with her spouse in the office 2 weeks post op from surgical fixation of the right ankle Trimalleolar fracture. The patient's pain is noted to be a 3/10. She is not currently taking any pain medication. She is currently taking blood thinning medication for DVT Prophylaxis as prescribed by her PCP. The patient presents nonweightbearing, wearing a splint, and is ambulating in a wheelchair. KARLI denies any numbness or tingling sensations to the ankle. She has other complaints at this time.	 [de-identified] : General: Alert and oriented x3. In no acute distress. Pleasant in nature with a normal affect. No apparent respiratory distress.\par The wound is intact. no evidence of any diastases or dehiscence. + surrounding erythema noted at the suture site. No fluctuance. The area is warm and well perfused. The patient is able to wiggle their toes. Neurovascularly intact.		\par \par Sutures are clean, dry, and intact. No signs of infection.  [de-identified] : Imaging from 10/26/2021 was reviewed with the patient and her spouse in the clinic today. No new imaging was obtained today.  [de-identified] : 81 year old  female present in the office 2 weeks post op from surgical fixation of the right ankle Trimalleolar fracture [de-identified] : 81 year old  female present in the office 2 weeks post op from surgical fixation of the right ankle Trimalleolar fracture \par \par I recommend that the patient be fitted for a cast. The patient was fitted for the cast in the office today. She should be non weight bearing until further notice. If the patient notices any loosening of the cast, they should call the office as soon as possible. The patient will continue to utilize blood thinning medications for DVT Prophylaxis as prescribed by her PCP.\par \par Further, I recommend that the patient take Keflex antibiotics for the course of the next 1 week. A prescription was provided in the office today.		 \par I have addressed all the patient's concerns surrounding the pathology of their condition. The patient understood and verbally agreed to the treatment plan. All of their questions were answered and they were satisfied with the visit. The patient should call the office if they have any questions or experience worsening symptoms.\par \par Follow up in 1 week.

## 2021-11-12 ENCOUNTER — APPOINTMENT (OUTPATIENT)
Dept: CARDIOLOGY | Facility: CLINIC | Age: 81
End: 2021-11-12
Payer: MEDICARE

## 2021-11-12 LAB
INR PPP: 2.1
PT BLD: 23.9

## 2021-11-12 PROCEDURE — 85610 PROTHROMBIN TIME: CPT | Mod: QW

## 2021-11-12 PROCEDURE — 93793 ANTICOAG MGMT PT WARFARIN: CPT

## 2021-11-17 ENCOUNTER — APPOINTMENT (OUTPATIENT)
Dept: CARDIOLOGY | Facility: CLINIC | Age: 81
End: 2021-11-17
Payer: MEDICARE

## 2021-11-17 VITALS — SYSTOLIC BLOOD PRESSURE: 156 MMHG | HEART RATE: 75 BPM | RESPIRATION RATE: 18 BRPM | DIASTOLIC BLOOD PRESSURE: 76 MMHG

## 2021-11-17 LAB
INR PPP: 2.9 RATIO
POCT-PROTHROMBIN TIME: 35.2 SECS
QUALITY CONTROL: YES

## 2021-11-17 PROCEDURE — 85610 PROTHROMBIN TIME: CPT | Mod: QW

## 2021-11-17 PROCEDURE — 93793 ANTICOAG MGMT PT WARFARIN: CPT

## 2021-11-19 ENCOUNTER — APPOINTMENT (OUTPATIENT)
Dept: ORTHOPEDIC SURGERY | Facility: CLINIC | Age: 81
End: 2021-11-19
Payer: MEDICARE

## 2021-11-19 PROCEDURE — 97760 ORTHOTIC MGMT&TRAING 1ST ENC: CPT

## 2021-11-19 PROCEDURE — 99024 POSTOP FOLLOW-UP VISIT: CPT

## 2021-11-19 PROCEDURE — 73610 X-RAY EXAM OF ANKLE: CPT | Mod: RT

## 2021-11-19 NOTE — HISTORY OF PRESENT ILLNESS
[___ Weeks Post Op] : [unfilled] weeks post op [Swelling] : swollen [Doing Well] : is doing well [Excellent Pain Control] : has excellent pain control [No Sign of Infection] : is showing no signs of infection [Sutures Removed] : sutures were removed [Steri-Strips Removed & Replaced] : steri-strips removed and replaced [Chills] : no chills [Fever] : no fever [Nausea] : no nausea [Vomiting] : no vomiting [Healed] : not healed [Discharge] : absent of discharge [Dehiscence] : not dehisced [de-identified] : s/p surgical fixation of the right ankle trimalleolar fracture\par DOS: 10/26/2021 [de-identified] : 81 year old female presenting with her spouse in the office 3 weeks post op from surgical fixation of the right ankle Trimalleolar fracture. The patient's pain is noted to be a 3/10.. She is continuing to take the Coumadin for DVT Prophylaxis as prescribed by her PCP. The patient presents nonweightbearing, wearing a cast, and is ambulating in a wheelchair. KARLI denies any numbness or tingling sensations to the ankle. She has other complaints at this time.	 [de-identified] : General: Alert and oriented x3. In no acute distress. Pleasant in nature with a normal affect. No apparent respiratory distress.\par The wound is intact. no evidence of any diastases or dehiscence. + surrounding erythema noted at the suture site. No fluctuance. The area is warm and well perfused. The patient is able to wiggle their toes. Neurovascularly intact.		\par \par Incisions are clean, dry, and intact. No signs of infection.  [de-identified] : 3V of the right ankle were ordered, obtained, and reviewed by me today, 11/19/2021, revealed: Hardware intact. Good alignment.  [de-identified] : 81 year old  female present in the office 2 weeks post op from surgical fixation of the right ankle Trimalleolar fracture [de-identified] : 81 year old  female present in the office 2 weeks post op from surgical fixation of the right ankle Trimalleolar fracture.\par \par XR imaging was completed in office today and results were reviewed with the patient.\par \par I recommended that the patient utilize a CAM boot. The patient was fitted for the CAM boot in the office today. The patient was educated about the boot wear pattern and utilization, as well as the timeframe to come out of the boot. She was also given full instructions for using the boot. The patient will remain nonweightbearing until further evaluation. She will continue with the Coumadin for DVT Prophylaxis. \par \par I recommend that the patient utilize ice and heat PRN. They can also elevate their right ankle above the level of the heart.		\par \par I have addressed all the patient's concerns surrounding the pathology of their condition. The patient understood and verbally agreed to the treatment plan. All of their questions were answered and they were satisfied with the visit. The patient should call the office if they have any questions or experience worsening symptoms.		\par \par Follow up in 3 weeks for re-evaluation and for new X-rays.

## 2021-11-19 NOTE — ADDENDUM
[FreeTextEntry1] : I, Angelina Vargas, acted solely as a scribe for Dr. Ricardo Montilla on this date 11/19/2021.\par \par All medical record entries made by the Scribe were at my, Dr. Ricardo Montilla, direction and personally dictated by me on 11/19/2021 . I have reviewed the chart and agree that the record accurately reflects my personal performance of the history, physical exam, assessment and plan. I have also personally directed, reviewed, and agreed with the chart.	\par

## 2021-11-29 ENCOUNTER — APPOINTMENT (OUTPATIENT)
Dept: CARDIOLOGY | Facility: CLINIC | Age: 81
End: 2021-11-29
Payer: MEDICARE

## 2021-11-29 PROCEDURE — 85610 PROTHROMBIN TIME: CPT | Mod: QW

## 2021-11-29 PROCEDURE — 93793 ANTICOAG MGMT PT WARFARIN: CPT

## 2021-12-03 ENCOUNTER — APPOINTMENT (OUTPATIENT)
Dept: ORTHOPEDIC SURGERY | Facility: CLINIC | Age: 81
End: 2021-12-03
Payer: MEDICARE

## 2021-12-03 PROCEDURE — 73610 X-RAY EXAM OF ANKLE: CPT | Mod: RT

## 2021-12-03 PROCEDURE — 99024 POSTOP FOLLOW-UP VISIT: CPT

## 2021-12-03 NOTE — HISTORY OF PRESENT ILLNESS
[___ Weeks Post Op] : [unfilled] weeks post op [Swelling] : swollen [Doing Well] : is doing well [Excellent Pain Control] : has excellent pain control [No Sign of Infection] : is showing no signs of infection [Erythema] : erythematous [Chills] : no chills [Fever] : no fever [Nausea] : no nausea [Vomiting] : no vomiting [Healed] : not healed [Discharge] : absent of discharge [Dehiscence] : not dehisced [de-identified] : s/p ORIF of the right ankle trimalleolar fracture without posterior malleolus fixation, ORIF of right distal tib-fib syndesmosis\par DOS: 10/26/2021 [de-identified] : 81 year old female presenting with her spouse in the office 5 weeks post op from surgical fixation of the right ankle Trimalleolar fracture. The patient's pain is noted to be a 3/10  She is continuing to take the Coumadin for DVT Prophylaxis as prescribed by her PCP. The patient is concerned with a lateral and distal wound to the ankle with drainage and erythema noted. She has utilized Neosporin PRN for this issue. She has also utilized Keflex antibiotics for 4 days in November 2021 but reports GI side affects. Today, the patient presents nonweightbearing, wearing a CAM boot, and is ambulating in a wheelchair. KARLI denies any numbness or tingling sensations to the ankle. She has other complaints at this time.	 [de-identified] : General: Alert and oriented x3. In no acute distress. Pleasant in nature with a normal affect. No apparent respiratory distress.\par The wound is intact. no evidence of any diastases. No fluctuance. The area is warm and well perfused. The patient is able to wiggle their toes. Neurovascularly intact.		\par \par + surrounding erythema and dehiscence to the lateral wound.  Distal wound noted with erythema and no evidence of drainage. The wounds were cleaned in office with Betadine solution.  [de-identified] : 3V of the right ankle were ordered, obtained, and reviewed by me today, 12/03/2021, revealed: Hardware intact. Good alignment. Stable fractures.  [de-identified] : 81 year old  female present in the office 5 weeks post op from ORIF of the right ankle trimalleolar fracture without posterior malleolus fixation, ORIF of right distal tib-fib syndesmosis [de-identified] : 81 year old female present in the office 5 weeks post op from surgical fixation of the right ankle Trimalleolar fracture.\par \par XR imaging was completed in office today and results were reviewed with the patient. At this time. I recommend that the patient take Augmentin 500 mg antibiotics for the next 1 week. A prescription was provided in the office today.		\par  \par Further, I recommend the patient undergo a course of physical therapy for the right ankle  2-3 times a week for a total of 6-8 weeks. A prescription was given for the physical therapy today. She may weight bear as tolerated in the CAM boot. 		\par \par I recommended that the patient continue to utilize a CAM boot.  The patient was educated about the boot wear pattern and utilization, as well as the timeframe to come out of the boot. She was also given full instructions for using the boot. I advised the patient to utilize Coumadin as instructed for blood thinning purposes.\par \par I have addressed all the patient's concerns surrounding the pathology of their condition. The patient understood and verbally agreed to the treatment plan. All of their questions were answered and they were satisfied with the visit. The patient should call the office if they have any questions or experience worsening symptoms.		\par \par Follow up in 2 weeks for re-evaluation.

## 2021-12-14 ENCOUNTER — APPOINTMENT (OUTPATIENT)
Dept: CARDIOLOGY | Facility: CLINIC | Age: 81
End: 2021-12-14
Payer: MEDICARE

## 2021-12-14 PROCEDURE — 85610 PROTHROMBIN TIME: CPT | Mod: QW

## 2021-12-14 PROCEDURE — 93793 ANTICOAG MGMT PT WARFARIN: CPT

## 2021-12-15 LAB
INR PPP: 2.6 RATIO
POCT-PROTHROMBIN TIME: 31 SECS
QUALITY CONTROL: YES

## 2021-12-16 LAB
INR PPP: 3.1 RATIO
POCT-PROTHROMBIN TIME: 37.3 SECS
QUALITY CONTROL: YES

## 2021-12-17 ENCOUNTER — APPOINTMENT (OUTPATIENT)
Dept: ORTHOPEDIC SURGERY | Facility: CLINIC | Age: 81
End: 2021-12-17

## 2021-12-17 ENCOUNTER — APPOINTMENT (OUTPATIENT)
Dept: ORTHOPEDIC SURGERY | Facility: CLINIC | Age: 81
End: 2021-12-17
Payer: MEDICARE

## 2021-12-17 PROCEDURE — 99024 POSTOP FOLLOW-UP VISIT: CPT

## 2021-12-17 PROCEDURE — 73610 X-RAY EXAM OF ANKLE: CPT | Mod: RT

## 2021-12-17 NOTE — HISTORY OF PRESENT ILLNESS
[___ Weeks Post Op] : [unfilled] weeks post op [Healed] : healed [Swelling] : swollen [Doing Well] : is doing well [Excellent Pain Control] : has excellent pain control [No Sign of Infection] : is showing no signs of infection [Chills] : no chills [Fever] : no fever [Nausea] : no nausea [Vomiting] : no vomiting [Discharge] : absent of discharge [Dehiscence] : not dehisced [de-identified] : s/p ORIF of the right ankle trimalleolar fracture without posterior malleolus fixation, ORIF of right distal tib-fib syndesmosis\par DOS: 10/26/2021 [de-identified] : 81 year old female presenting with her spouse in the office 7 weeks post op from surgical fixation of the right ankle Trimalleolar fracture. The patient's pain is noted to be a 3/10  She is continuing to take the Coumadin for DVT Prophylaxis as prescribed by her PCP. The patient is concerned with a lateral and distal wound to the ankle with drainage and erythema noted. She has utilized Neosporin PRN for this issue. She reports that she finished her course of Keflex antibiotics. Today, the patient presents nonweightbearing, wearing a CAM boot, and is ambulating with a walker. KARLI denies any numbness or tingling sensations to the ankle. She has other complaints at this time.	 [de-identified] : General: Alert and oriented x3. In no acute distress. Pleasant in nature with a normal affect. No apparent respiratory distress.\par The wound is intact. no evidence of any diastases. No fluctuance. The area is warm and well perfused. The patient is able to wiggle their toes. Neurovascularly intact.		\par \par + Improved surrounding erythema and dehiscence to the lateral wound.  Improved Distal wound noted with erythema and no evidence of drainage.  [de-identified] : 3V of the right ankle were ordered, obtained, and reviewed by me today, 12/17/2021, revealed: Hardware intact. Good alignment. Stable fractures.  [de-identified] : 81 year old  female present in the office 7 weeks post op from ORIF of the right ankle trimalleolar fracture without posterior malleolus fixation, ORIF of right distal tib-fib syndesmosis [de-identified] : 81 year old  female present in the office 7 weeks post op from ORIF of the right ankle trimalleolar fracture without posterior malleolus fixation, ORIF of right distal tib-fib syndesmosis.\par \par I recommend the patient undergo a course of physical therapy for the right ankle  2-3 times a week for a total of 6-8 weeks.\par \par With the guidance of physical therapy, I recommended that the patient begin to transition out of the CAM boot to an ASO brace. The ASO brace was given today. She will continue with the blood thinning medication for DVT Prophylaxis while in the CAM boot. \par \par I also recommended that the patient utilize ice and elevate their right ankle above the level of the heart.		\par 	\par I have addressed all the patient's concerns surrounding the pathology of their condition. The patient understood and verbally agreed to the treatment plan. All of their questions were answered and they were satisfied with the visit. The patient should call the office if they have any questions or experience worsening symptoms.		\par \par Follow up in 6-8 weeks for re-evaluation.

## 2021-12-17 NOTE — ADDENDUM
[FreeTextEntry1] : I, Angelina Vargas, acted solely as a scribe for Dr. Ricardo Montilla on this date 12/17/2021.\par \par All medical record entries made by the Scribe were at my, Dr. Ricardo Montilla, direction and personally dictated by me on 12/17/2021 . I have reviewed the chart and agree that the record accurately reflects my personal performance of the history, physical exam, assessment and plan. I have also personally directed, reviewed, and agreed with the chart.	\par

## 2022-01-05 ENCOUNTER — APPOINTMENT (OUTPATIENT)
Dept: CARDIOLOGY | Facility: CLINIC | Age: 82
End: 2022-01-05
Payer: MEDICARE

## 2022-01-05 VITALS — SYSTOLIC BLOOD PRESSURE: 159 MMHG | HEART RATE: 73 BPM | RESPIRATION RATE: 18 BRPM | DIASTOLIC BLOOD PRESSURE: 70 MMHG

## 2022-01-05 LAB
INR PPP: 2.8 RATIO
POCT-PROTHROMBIN TIME: 33.8 SECS
QUALITY CONTROL: YES

## 2022-01-05 PROCEDURE — 93793 ANTICOAG MGMT PT WARFARIN: CPT

## 2022-01-05 PROCEDURE — 85610 PROTHROMBIN TIME: CPT | Mod: QW

## 2022-01-24 ENCOUNTER — APPOINTMENT (OUTPATIENT)
Dept: ORTHOPEDIC SURGERY | Facility: CLINIC | Age: 82
End: 2022-01-24
Payer: MEDICARE

## 2022-01-24 PROCEDURE — 73610 X-RAY EXAM OF ANKLE: CPT | Mod: RT

## 2022-01-24 PROCEDURE — 99024 POSTOP FOLLOW-UP VISIT: CPT

## 2022-01-24 NOTE — HISTORY OF PRESENT ILLNESS
[___ Months Post Op] : [unfilled] months post op [0] : no pain reported [Clean/Dry/Intact] : clean, dry and intact [Healed] : healed [Swelling] : swollen [Neuro Intact] : an unremarkable neurological exam [Vascular Intact] : ~T peripheral vascular exam normal [Negative Trey's] : maneuvers demonstrated a negative Trey's sign [Doing Well] : is doing well [Excellent Pain Control] : has excellent pain control [No Sign of Infection] : is showing no signs of infection [Chills] : no chills [Fever] : no fever [Nausea] : no nausea [Vomiting] : no vomiting [Erythema] : not erythematous [Discharge] : absent of discharge [Dehiscence] : not dehisced [de-identified] : s/p ORIF of the right ankle trimalleolar fracture without posterior malleolus fixation, ORIF of right distal tib-fib syndesmosis\par DOS: 10/26/2021 [de-identified] : 81 year old female presenting with her spouse in the office approximately 3 months post op from surgical fixation of the right ankle Trimalleolar fracture. The patient's pain is noted to be improved since her last evaluation, 0/10. She is concerned with waxing and waning swelling to the surgical site. The patient has been attending physical therapy and has been completing home exercises for this issue, with significant improvement noted. The patient presents ambulating with a cane and is wearing regular shoes. KARLI denies any numbness or tingling sensations to the ankle. She has other complaints at this time.	 [de-identified] : General: Alert and oriented x3. In no acute distress. Pleasant in nature with a normal affect. No apparent respiratory distress.\par The wound is intact. no evidence of any diastases. No fluctuance. The area is warm and well perfused. The patient is able to wiggle their toes. Neurovascularly intact.		 [de-identified] : 3V of the right ankle were ordered, obtained, and reviewed by me today, 1/24/2021, revealed: Hardware intact and in good position. Healed fractures, stable x-rays.  [de-identified] : 81 year old female presenting with her spouse in the office approximately 3 months post op from surgical fixation of the right ankle Trimalleolar fracture. [de-identified] : 81 year old female presenting with her spouse in the office approximately 3 months post op from surgical fixation of the right ankle Trimalleolar fracture.\par \par XR imaging was completed in office today and results were reviewed with the patient.\par \par I recommend the patient continue to undergo a course of physical therapy for the right ankle  2-3 times a week for a total of 6-8 weeks. No new prescription was given for PT. I recommend that the patient continue to perform a home exercise program for the lower extremities.\par \par Further, I recommend that the patient utilize ice, NSAIDs, and heat PRN. They can also elevate their right ankle above the level of the heart.				\par I have addressed all the patient's concerns surrounding the pathology of their condition. The patient understood and verbally agreed to the treatment plan. All of their questions were answered and they were satisfied with the visit. The patient should call the office if they have any questions or experience worsening symptoms.\par \par Follow up in 3 months for re-evaluation.

## 2022-01-24 NOTE — ADDENDUM
[FreeTextEntry1] : I, Angelina Vargas, acted solely as a scribe for Dr. Ricardo Montilla on this date 01/24/2022.\par \par All medical record entries made by the Scribe were at my, Dr. Ricardo Montilla, direction and personally dictated by me on 01/24/2022 . I have reviewed the chart and agree that the record accurately reflects my personal performance of the history, physical exam, assessment and plan. I have also personally directed, reviewed, and agreed with the chart.	\par

## 2022-02-02 ENCOUNTER — APPOINTMENT (OUTPATIENT)
Dept: CARDIOLOGY | Facility: CLINIC | Age: 82
End: 2022-02-02
Payer: MEDICARE

## 2022-02-02 VITALS — SYSTOLIC BLOOD PRESSURE: 160 MMHG | DIASTOLIC BLOOD PRESSURE: 79 MMHG | HEART RATE: 79 BPM | RESPIRATION RATE: 18 BRPM

## 2022-02-02 DIAGNOSIS — S82.851A DISPLACED TRIMALLEOLAR FRACTURE OF RIGHT LOWER LEG, INITIAL ENCOUNTER FOR CLOSED FRACTURE: ICD-10-CM

## 2022-02-02 PROCEDURE — 85610 PROTHROMBIN TIME: CPT | Mod: QW

## 2022-02-02 PROCEDURE — 93793 ANTICOAG MGMT PT WARFARIN: CPT

## 2022-02-16 ENCOUNTER — APPOINTMENT (OUTPATIENT)
Dept: ORTHOPEDIC SURGERY | Facility: CLINIC | Age: 82
End: 2022-02-16
Payer: MEDICARE

## 2022-02-16 VITALS
SYSTOLIC BLOOD PRESSURE: 183 MMHG | DIASTOLIC BLOOD PRESSURE: 97 MMHG | BODY MASS INDEX: 28.51 KG/M2 | WEIGHT: 167 LBS | HEART RATE: 83 BPM | HEIGHT: 64 IN

## 2022-02-16 PROCEDURE — 20610 DRAIN/INJ JOINT/BURSA W/O US: CPT | Mod: RT

## 2022-02-16 PROCEDURE — 99214 OFFICE O/P EST MOD 30 MIN: CPT | Mod: 25

## 2022-02-16 PROCEDURE — 73562 X-RAY EXAM OF KNEE 3: CPT | Mod: RT

## 2022-02-16 NOTE — PROCEDURE
[de-identified] : I injected the patient's right knee today with cortisone.\par \par I discussed at length with the patient the planned steroid and lidocaine injection. The risks, benefits, convalescence and alternatives were reviewed. The possible side effects discussed included but were not limited to: pain, swelling, heat, bleeding, and redness. Symptoms are generally mild but if they are extensive then contact the office. Giving pain relievers by mouth such as NSAIDs or Tylenol can generally treat the reactions to steroid and lidocaine. Rare cases of infection have been noted. Rash, hives and itching may occur post injection. If you have muscle pain or cramps, flushing and or swelling of the face, rapid heart beat, nausea, dizziness, fever, chills, headache, difficulty breathing, swelling in the arms or legs, or have a prickly feeling of your skin, contact a health care provider immediately. Following this discussion, the knee was prepped with Alcohol and under sterile condition the 80 mg Depo-Medrol and 6 cc Lidocaine injection was performed with a 20 gauge needle through a superolateral injection site. The needle was introduced into the joint, aspiration was performed to ensure intra-articular placement and the medication was injected. Upon withdrawal of the needle the site was cleaned with alcohol and a band aid applied. The patient tolerated the injection well and there were no adverse effects. Post injection instructions included no strenuous activity for 24 hours, cryotherapy and if there are any adverse effects to contact the office.

## 2022-02-16 NOTE — END OF VISIT
[FreeTextEntry3] : I, Gallo Paz, acted solely as a scribe for Dr. Elijah Fletcher on this date 02/16/2022.

## 2022-02-16 NOTE — HISTORY OF PRESENT ILLNESS
[Pain Location] : pain [5] : a current pain level of 5/10 [2] : a minimum pain level of 2/10 [7] : a maximum pain level of 7/10 [Bending] : worsened by bending [Rest] : relieved by rest [de-identified] : 82 y/o F presents with right knee pain. She reports instability with stairs and squatting. She saw Dr. Bautista in 2016 and 2017 for right knee pain. In 2016, she received a Gel-One injection. In 2017, she received Monovisc injection. She notes about 2 years of relief with these injections. Her pain is in the anterior knee. She fell in a hole and fractured her right ankle in the beginning of October. She is s/p ORIF of the right ankle with Dr. Montilla on 10/26/2021. She is currently in PT for the ankle.  [de-identified] : squatting

## 2022-02-16 NOTE — PHYSICAL EXAM
[LE] : Sensory: Intact in bilateral lower extremities [ALL] : dorsalis pedis, posterior tibial, femoral, popliteal, and radial 2+ and symmetric bilaterally [Normal] : Alert and in no acute distress [Poor Appearance] : well-appearing [de-identified] : GENERAL APPEARANCE: Well nourished and hydrated, pleasant, alert, and oriented x 3. Appears their stated age. \par HEENT: Normocephalic, extraocular eye motion intact. Nasal septum midline. Oral cavity clear. External auditory canal clear. \par RESPIRATORY: Breath sounds clear and audible in all lobes. No wheezing, No accessory muscle use.\par CARDIOVASCULAR: No apparent abnormalities. No lower leg edema. No varicosities. Pedal pulses are palpable.\par NEUROLOGIC: Sensation is normal, no muscle weakness in the upper or lower extremities.\par DERMATOLOGIC: No apparent skin lesions, moist, warm, no rash.\par SPINE: Cervical spine appears normal and moves freely; thoracic spine appears normal and moves freely; lumbosacral spine appears normal and moves freely, normal, nontender.\par MUSCULOSKELETAL: Hands, wrists, and elbows are normal and move freely, shoulders are normal and move freely.  [de-identified] : Right knee exam shows ROM 0-120 degrees, slight effusion, tenderness over knee cap [de-identified] : 3V xray of the right knee done in the office today and reviewed by Dr. 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

## 2022-02-16 NOTE — DISCUSSION/SUMMARY
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 Conservative therapy and surgical options discussed in detail with the patient. Based on imaging, she is a candidate for a right TKA. She had injections in 2016 and 2017 and we encourage her to continue with them at this time. The injections we talked about were cortisone injections and HA injections. Today she opted for a right knee cortisone injection. She will follow up in three months for a repeat cortisone injection if needed. If she does not have a good response to the cortisone injection, we will try HA injections. \par \par The patient is a 81 year individual with end stage arthritis of their right knee joint. Based upon the patient's continued symptoms and failure to respond to conservative treatment I have recommended a right total knee arthroplasty for this patient. A long discussion took place with the patient describing what a total joint replacement is and what the procedure would entail. A total knee arthroplasty model, similar to the implant that was used during the operation, was utilized to demonstrate and to discuss the various bearing surfaces of the implants. The hospitalization and post-operative care and rehabilitation were also discussed. The use of perioperative antibiotics and DVT prophylaxis were discussed. The risk, benefits and alternatives to a surgical intervention were discussed at length with the patient. The patient was also advised of risks related to the medical comorbidities, elevated body mass index (BMI), and smoking where applicable. We discussed how to reduce modifiable risk factors and encouraged smoking cessation were applicable.. A lengthy discussion took place to review the most common complications including but not limited to: deep vein thrombosis, pulmonary embolus, heart attack, stroke, infection, wound breakdown, numbness, damage to nerves, tendon, muscles, arteries or other blood vessels, death and other possible complications from anesthesia. The patient was told that we will take steps to minimize these risks by using sterile technique, antibiotics and DVT prophylaxis when appropriate and follow the patient postoperatively in the office setting to monitor progress. The possibility of recurrent pain, no improvement in pain and actual worsening of pain were also discussed with the patient.\par The discharge plan of care focused on the patient going home following surgery. The patient was encouraged to make the necessary arrangements to have someone stay with them when they are discharged home. Following discharge, a home care nurse was to the patient. The home care nurse would open the patient’s home care case and request home physical therapy services. Home physical therapy was to commence following discharge provided it was appropriate and covered by the health insurance benefit plan. \par The benefits of surgery were discussed with the patient including the potential for improving her current clinical condition through operative intervention. Alternatives to surgical intervention including continued conservative management were also discussed in detail. All questions were answered to the satisfaction of the patient. The treatment plan of care, as well as a model of a total knee arthroplasty equivalent to the one that will be used for their total joint replacement, was shared with the patient. The patient agreed to the plan of care as well as the use of implants in their total joint replacement.

## 2022-03-02 ENCOUNTER — APPOINTMENT (OUTPATIENT)
Dept: CARDIOLOGY | Facility: CLINIC | Age: 82
End: 2022-03-02
Payer: MEDICARE

## 2022-03-02 ENCOUNTER — NON-APPOINTMENT (OUTPATIENT)
Age: 82
End: 2022-03-02

## 2022-03-02 PROCEDURE — 85610 PROTHROMBIN TIME: CPT | Mod: QW

## 2022-03-02 PROCEDURE — 93793 ANTICOAG MGMT PT WARFARIN: CPT

## 2022-03-08 LAB
INR PPP: 2.2 RATIO
POCT-PROTHROMBIN TIME: 26.2 SECS
QUALITY CONTROL: YES

## 2022-03-18 ENCOUNTER — EMERGENCY (EMERGENCY)
Facility: HOSPITAL | Age: 82
LOS: 0 days | Discharge: HOME | End: 2022-03-18
Attending: EMERGENCY MEDICINE
Payer: MEDICARE

## 2022-03-18 VITALS
DIASTOLIC BLOOD PRESSURE: 80 MMHG | SYSTOLIC BLOOD PRESSURE: 184 MMHG | TEMPERATURE: 99 F | HEART RATE: 91 BPM | OXYGEN SATURATION: 100 % | RESPIRATION RATE: 20 BRPM

## 2022-03-18 VITALS — HEIGHT: 64 IN | WEIGHT: 164.91 LBS

## 2022-03-18 DIAGNOSIS — E78.5 HYPERLIPIDEMIA, UNSPECIFIED: ICD-10-CM

## 2022-03-18 DIAGNOSIS — Z90.49 ACQUIRED ABSENCE OF OTHER SPECIFIED PARTS OF DIGESTIVE TRACT: Chronic | ICD-10-CM

## 2022-03-18 DIAGNOSIS — Z88.9 ALLERGY STATUS TO UNSPECIFIED DRUGS, MEDICAMENTS AND BIOLOGICAL SUBSTANCES: ICD-10-CM

## 2022-03-18 DIAGNOSIS — Z88.6 ALLERGY STATUS TO ANALGESIC AGENT: ICD-10-CM

## 2022-03-18 DIAGNOSIS — Z90.81 ACQUIRED ABSENCE OF SPLEEN: Chronic | ICD-10-CM

## 2022-03-18 DIAGNOSIS — Z91.040 LATEX ALLERGY STATUS: ICD-10-CM

## 2022-03-18 DIAGNOSIS — L03.116 CELLULITIS OF LEFT LOWER LIMB: ICD-10-CM

## 2022-03-18 DIAGNOSIS — M79.89 OTHER SPECIFIED SOFT TISSUE DISORDERS: ICD-10-CM

## 2022-03-18 LAB
ANION GAP SERPL CALC-SCNC: 4 MMOL/L — LOW (ref 5–17)
APTT BLD: 38.4 SEC — HIGH (ref 27.5–35.5)
BASOPHILS # BLD AUTO: 0.04 K/UL — SIGNIFICANT CHANGE UP (ref 0–0.2)
BASOPHILS NFR BLD AUTO: 0.5 % — SIGNIFICANT CHANGE UP (ref 0–2)
BUN SERPL-MCNC: 17 MG/DL — SIGNIFICANT CHANGE UP (ref 7–23)
CALCIUM SERPL-MCNC: 9.3 MG/DL — SIGNIFICANT CHANGE UP (ref 8.5–10.1)
CHLORIDE SERPL-SCNC: 108 MMOL/L — SIGNIFICANT CHANGE UP (ref 96–108)
CO2 SERPL-SCNC: 28 MMOL/L — SIGNIFICANT CHANGE UP (ref 22–31)
CREAT SERPL-MCNC: 0.88 MG/DL — SIGNIFICANT CHANGE UP (ref 0.5–1.3)
EGFR: 67 ML/MIN/1.73M2 — SIGNIFICANT CHANGE UP
EOSINOPHIL # BLD AUTO: 0.28 K/UL — SIGNIFICANT CHANGE UP (ref 0–0.5)
EOSINOPHIL NFR BLD AUTO: 3.4 % — SIGNIFICANT CHANGE UP (ref 0–6)
GLUCOSE SERPL-MCNC: 112 MG/DL — HIGH (ref 70–99)
HCT VFR BLD CALC: 40.2 % — SIGNIFICANT CHANGE UP (ref 34.5–45)
HGB BLD-MCNC: 12.9 G/DL — SIGNIFICANT CHANGE UP (ref 11.5–15.5)
IMM GRANULOCYTES NFR BLD AUTO: 0.2 % — SIGNIFICANT CHANGE UP (ref 0–1.5)
INR BLD: 2.82 RATIO — HIGH (ref 0.88–1.16)
INR PPP: 3 RATIO
LYMPHOCYTES # BLD AUTO: 2.5 K/UL — SIGNIFICANT CHANGE UP (ref 1–3.3)
LYMPHOCYTES # BLD AUTO: 30.5 % — SIGNIFICANT CHANGE UP (ref 13–44)
MCHC RBC-ENTMCNC: 31.9 PG — SIGNIFICANT CHANGE UP (ref 27–34)
MCHC RBC-ENTMCNC: 32.1 GM/DL — SIGNIFICANT CHANGE UP (ref 32–36)
MCV RBC AUTO: 99.3 FL — SIGNIFICANT CHANGE UP (ref 80–100)
MONOCYTES # BLD AUTO: 0.62 K/UL — SIGNIFICANT CHANGE UP (ref 0–0.9)
MONOCYTES NFR BLD AUTO: 7.6 % — SIGNIFICANT CHANGE UP (ref 2–14)
NEUTROPHILS # BLD AUTO: 4.75 K/UL — SIGNIFICANT CHANGE UP (ref 1.8–7.4)
NEUTROPHILS NFR BLD AUTO: 57.8 % — SIGNIFICANT CHANGE UP (ref 43–77)
PLATELET # BLD AUTO: 374 K/UL — SIGNIFICANT CHANGE UP (ref 150–400)
POCT-PROTHROMBIN TIME: 35.4 SECS
POTASSIUM SERPL-MCNC: 4 MMOL/L — SIGNIFICANT CHANGE UP (ref 3.5–5.3)
POTASSIUM SERPL-SCNC: 4 MMOL/L — SIGNIFICANT CHANGE UP (ref 3.5–5.3)
PROTHROM AB SERPL-ACNC: 33.1 SEC — HIGH (ref 10.5–13.4)
QUALITY CONTROL: YES
RBC # BLD: 4.05 M/UL — SIGNIFICANT CHANGE UP (ref 3.8–5.2)
RBC # FLD: 15 % — HIGH (ref 10.3–14.5)
SODIUM SERPL-SCNC: 140 MMOL/L — SIGNIFICANT CHANGE UP (ref 135–145)
WBC # BLD: 8.21 K/UL — SIGNIFICANT CHANGE UP (ref 3.8–10.5)
WBC # FLD AUTO: 8.21 K/UL — SIGNIFICANT CHANGE UP (ref 3.8–10.5)

## 2022-03-18 PROCEDURE — 85730 THROMBOPLASTIN TIME PARTIAL: CPT

## 2022-03-18 PROCEDURE — 80048 BASIC METABOLIC PNL TOTAL CA: CPT

## 2022-03-18 PROCEDURE — 99284 EMERGENCY DEPT VISIT MOD MDM: CPT | Mod: FS

## 2022-03-18 PROCEDURE — 93970 EXTREMITY STUDY: CPT

## 2022-03-18 PROCEDURE — 85610 PROTHROMBIN TIME: CPT

## 2022-03-18 PROCEDURE — 36415 COLL VENOUS BLD VENIPUNCTURE: CPT

## 2022-03-18 PROCEDURE — 93970 EXTREMITY STUDY: CPT | Mod: 26

## 2022-03-18 PROCEDURE — 85025 COMPLETE CBC W/AUTO DIFF WBC: CPT

## 2022-03-18 PROCEDURE — 99284 EMERGENCY DEPT VISIT MOD MDM: CPT | Mod: 25

## 2022-03-18 RX ADMIN — Medication 100 MILLIGRAM(S): at 21:42

## 2022-03-18 NOTE — ED STATDOCS - PATIENT PORTAL LINK FT
You can access the FollowMyHealth Patient Portal offered by Health system by registering at the following website: [email or web address]. By joining [facility]’s FollowMyHealth portal, you will also be able to view your health information using other applications (apps) compatible with our system.

## 2022-03-18 NOTE — ED STATDOCS - NSICDXPASTMEDICALHX_GEN_ALL_CORE_FT
PAST MEDICAL HISTORY:  Anticardiolipin antibody syndrome     Hyperlipidemia     Occlusive thrombus

## 2022-03-18 NOTE — ED STATDOCS - CHPI ED SYMPTOM NEG
no chest pain, no lightheadedness/dizziness, no SOB no chest pain, no lightheadedness/dizziness, no SOB/no burning urination/no fever/no hematuria/no nausea/no abdominal distention/no blood in stool/no diarrhea/no dysuria/no vomiting/no palpitations

## 2022-03-18 NOTE — ED STATDOCS - NSFOLLOWUPINSTRUCTIONS_ED_ALL_ED_FT
Follow up with your primary care doctor and hematologist for further evaluation.   Take the antibiotics as directed.     Return to the ER for chest pain, shortness of breath, fever, or any new or other concerns.

## 2022-03-18 NOTE — ED STATDOCS - PROGRESS NOTE DETAILS
80 yo female with a PMH of antiphospholipid syndrome on coumadin (hematologist Dr. Valentino at [facility]), dvt (only to the LLE x3 or 4ttimes int eh past), thrombophlebitis, presents with a streak of redness to the inner L thigh that she noticed today. With her h/o, pt was worried that she has a DVT and same in for evaluation. Denies fever, cp, sob, abd pain. Will check INR and sono of the LEs and reeval. -Jamie Triana PA-C Sono and labs unremarkable. INR 2.8. Will treat with abx for possible infection and gave strict return precautions. Pt to f/u with pmd/hematologist. -Jamie Triana PA-C

## 2022-03-18 NOTE — ED STATDOCS - CARE PLAN
1 Principal Discharge DX:	Cellulitis of left thigh  Secondary Diagnosis:	Swelling of thigh   Principal Discharge DX:	Cellulitis of left thigh  Goal:	no evidence of DVT, abx provided, strict return precuations, follow up with PMD, patient is comfortable to go home, will return if any worsening or if unable to see PMD or the specialist int he next two days  Secondary Diagnosis:	Swelling of thigh

## 2022-03-18 NOTE — ED STATDOCS - NS ED ATTENDING STATEMENT MOD
This was a shared visit with the JINA. I reviewed and verified the documentation and independently performed the documented:

## 2022-03-18 NOTE — ED STATDOCS - CONSTITUTIONAL, MLM
normal... well appearing and in no apparent distress. well appearing and in no apparent distress. Nontoxic appearing. AAOx4.

## 2022-03-18 NOTE — ED STATDOCS - MUSCULOSKELETAL, MLM
range of motion is not limited and there is no muscle tenderness. +Mild swelling of LLE. 2+ pulses dp and radial arteries. range of motion is not limited and there is no muscle tenderness. +Mild swelling of LLE. 2+ pulses dp and radial arteries. No saddle anesthesia. No laxity of hip, knee or ankle. NVI limbs.

## 2022-03-18 NOTE — ED ADULT TRIAGE NOTE - CHIEF COMPLAINT QUOTE
Pt reports hx of thrombophlebitis in past. Pt reports left groin redness with streaking line. Denies fever, SOB, CP, or long travel.

## 2022-03-18 NOTE — ED ADULT NURSE NOTE - OBJECTIVE STATEMENT
Pt presents to the ED with c/o L groin swelling. Hx of thrombophlebitis. Unable to visualize area in ST. AAOx3. Respirations even and unlabored, NAD. Skin warm, dry, color appropriate.

## 2022-03-18 NOTE — ED STATDOCS - OBJECTIVE STATEMENT
82 y/o female with PMHx of APL (on Coumadin), HLD presents to the ED worsening swelling sensation in left groin/thigh. Pt states she is worried about a DVT. Denies chest pain, lightheadedness/dizziness, or SOB. No other complaints at this time. Pt had recent ankle surgery with rosas Ramos. Hematologist: Dr. Valentino. Non-smoker. Non-drinker. No illicit drug use. 80 y/o female with PMHx of Antiphospholipid syndrome (on Coumadin), HLD presents to the ED worsening swelling sensation in left groin/thigh. Pt states she is worried about a DVT. Denies chest pain, lightheadedness/dizziness, or SOB. No other complaints at this time including no back pain, abdominal pain, perineum pain, difficulty ambulating, saddle anesthesia, incontinence of urine or stool, or any visual or focal neurological complaints. Pt had recent ankle surgery with rosas Ramos. Hematologist: Dr. Valentino. Non-smoker. Non-drinker. No illicit drug use.

## 2022-03-18 NOTE — ED STATDOCS - NS_ ATTENDINGSCRIBEDETAILS _ED_A_ED_FT
I Mt Ordonez MD saw and examined the patient. Scribe documented for me and under my supervision. I have modified the scribe's documentation where necessary to reflect my history, physical exam and other relevant documentations pertinent to the care of the patient.

## 2022-03-18 NOTE — ED STATDOCS - ATTENDING CONTRIBUTION TO CARE
I Mt Ordonez MD saw and examined the patient. MLP saw and examined the patient under my supervision. I discussed the care of the patient with MLP and agree with MLP's plan, assessment and care of the patient while in the ED.

## 2022-03-18 NOTE — ED STATDOCS - CLINICAL SUMMARY MEDICAL DECISION MAKING FREE TEXT BOX
Plan for US, labs, reassessment. If pt has a DVT, since she is already on AC, will admit for AC failure, otherwise will reassess. Plan for US, labs, reassessment. If pt has a DVT, since she is already on AC, will admit for AC failure, otherwise will reassess. Patient will contact Dr. Montilla and PMD, verbalizes understanding of instructions for follow up.

## 2022-03-18 NOTE — ED STATDOCS - PLAN OF CARE
no evidence of DVT, abx provided, strict return precuations, follow up with PMD, patient is comfortable to go home, will return if any worsening or if unable to see PMD or the specialist int he next two days

## 2022-03-29 ENCOUNTER — APPOINTMENT (OUTPATIENT)
Dept: DERMATOLOGY | Facility: CLINIC | Age: 82
End: 2022-03-29
Payer: MEDICARE

## 2022-03-29 DIAGNOSIS — L72.3 SEBACEOUS CYST: ICD-10-CM

## 2022-03-29 DIAGNOSIS — L85.3 XEROSIS CUTIS: ICD-10-CM

## 2022-03-29 DIAGNOSIS — Z00.00 ENCOUNTER FOR GENERAL ADULT MEDICAL EXAMINATION W/OUT ABNORMAL FINDINGS: ICD-10-CM

## 2022-03-29 DIAGNOSIS — D48.5 NEOPLASM OF UNCERTAIN BEHAVIOR OF SKIN: ICD-10-CM

## 2022-03-29 PROCEDURE — 99213 OFFICE O/P EST LOW 20 MIN: CPT | Mod: 25

## 2022-03-29 PROCEDURE — 10060 I&D ABSCESS SIMPLE/SINGLE: CPT

## 2022-03-29 PROCEDURE — 11102 TANGNTL BX SKIN SINGLE LES: CPT | Mod: 59

## 2022-03-29 PROCEDURE — 17110 DESTRUCTION B9 LES UP TO 14: CPT | Mod: 59

## 2022-03-29 RX ORDER — NITROFURANTOIN (MONOHYDRATE/MACROCRYSTALS) 25; 75 MG/1; MG/1
100 CAPSULE ORAL
Qty: 14 | Refills: 0 | Status: DISCONTINUED | COMMUNITY
Start: 2019-07-09 | End: 2022-03-29

## 2022-03-29 RX ORDER — TRAMADOL HYDROCHLORIDE 50 MG/1
50 TABLET, COATED ORAL
Qty: 30 | Refills: 0 | Status: DISCONTINUED | COMMUNITY
Start: 2021-11-23 | End: 2022-03-29

## 2022-03-29 RX ORDER — AMOXICILLIN AND CLAVULANATE POTASSIUM 500; 125 MG/1; MG/1
500-125 TABLET, FILM COATED ORAL
Qty: 14 | Refills: 0 | Status: DISCONTINUED | COMMUNITY
Start: 2021-12-03 | End: 2022-03-29

## 2022-03-29 RX ORDER — SOLIFENACIN SUCCINATE 10 MG/1
TABLET, FILM COATED ORAL
Refills: 0 | Status: DISCONTINUED | COMMUNITY
End: 2022-03-29

## 2022-03-29 RX ORDER — WARFARIN 5 MG/1
5 TABLET ORAL
Qty: 90 | Refills: 3 | Status: DISCONTINUED | COMMUNITY
Start: 2021-09-14 | End: 2022-03-29

## 2022-03-29 RX ORDER — FLUOROURACIL 5 MG/G
0.5 CREAM TOPICAL
Qty: 1 | Refills: 1 | Status: DISCONTINUED | COMMUNITY
Start: 2018-09-13 | End: 2022-03-29

## 2022-03-29 RX ORDER — MUPIROCIN 20 MG/G
2 OINTMENT TOPICAL
Qty: 15 | Refills: 0 | Status: DISCONTINUED | COMMUNITY
Start: 2018-04-05 | End: 2022-03-29

## 2022-03-29 RX ORDER — FLUOROURACIL 50 MG/G
5 CREAM TOPICAL TWICE DAILY
Qty: 20 | Refills: 1 | Status: DISCONTINUED | COMMUNITY
Start: 2018-09-13 | End: 2022-03-29

## 2022-03-29 RX ORDER — CEPHALEXIN 500 MG/1
500 CAPSULE ORAL 3 TIMES DAILY
Qty: 21 | Refills: 0 | Status: DISCONTINUED | COMMUNITY
Start: 2021-11-10 | End: 2022-03-29

## 2022-03-29 RX ORDER — ZOLPIDEM TARTRATE 5 MG/1
5 TABLET ORAL
Qty: 30 | Refills: 0 | Status: DISCONTINUED | COMMUNITY
Start: 2017-11-28 | End: 2022-03-29

## 2022-03-29 RX ORDER — VIBEGRON 75 MG/1
TABLET, FILM COATED ORAL
Refills: 0 | Status: ACTIVE | COMMUNITY

## 2022-03-29 NOTE — ASSESSMENT
[FreeTextEntry1] : A complete skin examination was performed.  We discussed the importance of photoprotection, including the use of hats, protective clothing and sunscreens with an SPF of at least 30.  Sun avoidance was also discussed.  The ABCDE's of melanoma was discussed with the patient.  Regular skin exams are encouraged.\par \par r/o BCC of the left shoulder.\par Plan D&C if positive.\par \par Xerosis\par Emollients.

## 2022-03-29 NOTE — PHYSICAL EXAM
[Alert] : alert [Oriented x 3] : ~L oriented x 3 [Well Nourished] : well nourished [Full Body Skin Exam Performed] : performed [FreeTextEntry3] : A full skin exam was performed including the scalp, face, neck, chest, abdomen, back, buttocks, upper extremities and lower extremities.  The patient declined examination of the breasts and genitalia.  \par The exam did show the following benign growths:\par Seborrheic keratoses.\par Lentigines.\par \par Pearly papule with telangiectasias and crust, left superior shoulder.\par \par Greasy tan erythematous papules, left forehead x 2.\par \par Xerosis, diffusely.

## 2022-03-29 NOTE — HISTORY OF PRESENT ILLNESS
[FreeTextEntry1] : Patient presents for skin examination. [de-identified] : Notes irritated lesions of the left cheek, with slow growth, and irritated lesions of the forehead.

## 2022-03-30 ENCOUNTER — APPOINTMENT (OUTPATIENT)
Dept: CARDIOLOGY | Facility: CLINIC | Age: 82
End: 2022-03-30
Payer: MEDICARE

## 2022-03-30 VITALS — RESPIRATION RATE: 18 BRPM | SYSTOLIC BLOOD PRESSURE: 167 MMHG | HEART RATE: 78 BPM | DIASTOLIC BLOOD PRESSURE: 83 MMHG

## 2022-03-30 LAB
INR PPP: 2.8 RATIO
POCT-PROTHROMBIN TIME: 33.1 SECS
QUALITY CONTROL: YES

## 2022-03-30 PROCEDURE — 85610 PROTHROMBIN TIME: CPT | Mod: QW

## 2022-03-30 PROCEDURE — 93793 ANTICOAG MGMT PT WARFARIN: CPT

## 2022-04-11 LAB — CORE LAB BIOPSY: NORMAL

## 2022-04-25 ENCOUNTER — APPOINTMENT (OUTPATIENT)
Dept: ORTHOPEDIC SURGERY | Facility: CLINIC | Age: 82
End: 2022-04-25
Payer: MEDICARE

## 2022-04-25 DIAGNOSIS — S82.841A DISPLACED BIMALLEOLAR FRACTURE OF RIGHT LOWER LEG, INITIAL ENCOUNTER FOR CLOSED FRACTURE: ICD-10-CM

## 2022-04-25 PROCEDURE — 73610 X-RAY EXAM OF ANKLE: CPT | Mod: RT

## 2022-04-25 PROCEDURE — 99213 OFFICE O/P EST LOW 20 MIN: CPT

## 2022-04-25 NOTE — ADDENDUM
[FreeTextEntry1] : I, Angelina Vargas, acted solely as a scribe for Dr. Ricardo Montilla on this date 04/25/2022.\par \par All medical record entries made by the Scribe were at my, Dr. Ricardo Montilla, direction and personally dictated by me on 04/25/2022 . I have reviewed the chart and agree that the record accurately reflects my personal performance of the history, physical exam, assessment and plan. I have also personally directed, reviewed, and agreed with the chart.	\par

## 2022-04-25 NOTE — DISCUSSION/SUMMARY
[de-identified] : Today I had a lengthy discussion with the patient regarding their right ankle, 6 months post op. s/p ORIF of the right ankle trimalleolar fracture without posterior malleolus fixation, ORIF of right distal tib-fib syndesmosis ( DOS: 10/26/2021). I have addressed all the patient's concerns surrounding the pathology of their condition. XR imaging was completed in office today and results were reviewed with the patient. At this time, the patient may follow up on an as-needed basis. No acute orthopedic intervention were deemed necessary at this time. The patient understood and verbally agreed to the treatment plan. All of their questions were answered and they were satisfied with the visit. The patient should call the office if they have any questions or experience worsening symptoms.
Yes - the patient is able to be screened

## 2022-04-25 NOTE — HISTORY OF PRESENT ILLNESS
[FreeTextEntry1] : The patient is a 81 year old female presenting for a follow-up evaluation of her right ankle. She is 6 months post op. s/p ORIF of the right ankle trimalleolar fracture without posterior malleolus fixation, ORIF of right distal tib-fib syndesmosis ( DOS: 10/26/2021). Overall the patient is doing well. She reports that she is walking her dog and has advanced her activities. She denies any pain to her ankle in the office today. She does report waxing and waning swelling over her ankle. She is wearing sneakers and is walking without assistance. No other complaints. \par

## 2022-04-25 NOTE — REASON FOR VISIT
[Follow-Up Visit] : a follow-up visit for [FreeTextEntry2] : 6 months status post ORIF of the right ankle

## 2022-04-25 NOTE — PHYSICAL EXAM
[de-identified] : General: Alert and oriented x3. In no acute distress. Pleasant in nature with a normal affect. No apparent respiratory distress.\par \par Right Ankle Exam\par Skin: Healed incisions. Clean, dry, intact\par Inspection: No obvious malalignment, no swelling, no effusion; no lymphadenopathy\par Pulses: 2+ DP/PT pulses\par ROM: 10 degrees of dorsiflexion, 40 degrees of plantarflexion, 10 degrees of subtalar motion\par Tenderness: Slight syndesmotic tenderness. No tenderness over the lateral malleolus, no CFL/ATFL/PTFL pain. No medial malleolus pain, no deltoid ligament pain. No proximal fibular pain. No heel pain.\par Stability: Negative anterior/posterior drawer.\par Strength: 5/5 TA/GS/EHL\par Neuro: In tact to light touch throughout\par Additional tests: Negative Mortons test.  [de-identified] : 3V of the right ankle were ordered, obtained, and reviewed by me today, 04/25/2022, revealed: Hardware intact and in good position. Healed fractures, stable x-rays.

## 2022-04-27 ENCOUNTER — APPOINTMENT (OUTPATIENT)
Dept: CARDIOLOGY | Facility: CLINIC | Age: 82
End: 2022-04-27
Payer: MEDICARE

## 2022-04-27 VITALS — DIASTOLIC BLOOD PRESSURE: 80 MMHG | SYSTOLIC BLOOD PRESSURE: 153 MMHG | RESPIRATION RATE: 18 BRPM | HEART RATE: 89 BPM

## 2022-04-27 PROCEDURE — 93793 ANTICOAG MGMT PT WARFARIN: CPT

## 2022-04-27 PROCEDURE — 85610 PROTHROMBIN TIME: CPT | Mod: QW

## 2022-04-28 ENCOUNTER — APPOINTMENT (OUTPATIENT)
Dept: DERMATOLOGY | Facility: CLINIC | Age: 82
End: 2022-04-28
Payer: MEDICARE

## 2022-04-28 PROCEDURE — 11603 EXC TR-EXT MAL+MARG 2.1-3 CM: CPT

## 2022-04-28 PROCEDURE — 12032 INTMD RPR S/A/T/EXT 2.6-7.5: CPT

## 2022-05-01 LAB
INR PPP: 2.3 RATIO
POCT-PROTHROMBIN TIME: 28 SECS
QUALITY CONTROL: YES

## 2022-05-09 ENCOUNTER — APPOINTMENT (OUTPATIENT)
Dept: DERMATOLOGY | Facility: CLINIC | Age: 82
End: 2022-05-09
Payer: MEDICARE

## 2022-05-09 DIAGNOSIS — C44.619 BASAL CELL CARCINOMA OF SKIN OF LEFT UPPER LIMB, INCLUDING SHOULDER: ICD-10-CM

## 2022-05-09 PROCEDURE — 99024 POSTOP FOLLOW-UP VISIT: CPT

## 2022-05-09 NOTE — HISTORY OF PRESENT ILLNESS
[FreeTextEntry1] : Suture removal. [de-identified] : Left shoulder, well healed, without evidence of infection.\par Sutures removed.\par Path still pending - will call patient when available.\par f/u in 5 months as planned for TBSE.

## 2022-05-26 ENCOUNTER — APPOINTMENT (OUTPATIENT)
Dept: CARDIOLOGY | Facility: CLINIC | Age: 82
End: 2022-05-26
Payer: MEDICARE

## 2022-05-26 VITALS — RESPIRATION RATE: 18 BRPM | SYSTOLIC BLOOD PRESSURE: 158 MMHG | HEART RATE: 73 BPM | DIASTOLIC BLOOD PRESSURE: 76 MMHG

## 2022-05-26 LAB
INR PPP: 1.4 RATIO
POCT-PROTHROMBIN TIME: 17 SECS
QUALITY CONTROL: YES

## 2022-05-26 PROCEDURE — 93793 ANTICOAG MGMT PT WARFARIN: CPT

## 2022-05-26 PROCEDURE — 85610 PROTHROMBIN TIME: CPT | Mod: QW

## 2022-05-31 LAB — CORE LAB BIOPSY: NORMAL

## 2022-06-01 ENCOUNTER — APPOINTMENT (OUTPATIENT)
Dept: CARDIOLOGY | Facility: CLINIC | Age: 82
End: 2022-06-01
Payer: MEDICARE

## 2022-06-01 VITALS — SYSTOLIC BLOOD PRESSURE: 161 MMHG | HEART RATE: 77 BPM | DIASTOLIC BLOOD PRESSURE: 68 MMHG | RESPIRATION RATE: 18 BRPM

## 2022-06-01 LAB
INR PPP: 2.3 RATIO
POCT-PROTHROMBIN TIME: 27.4 SECS
QUALITY CONTROL: YES

## 2022-06-01 PROCEDURE — 85610 PROTHROMBIN TIME: CPT | Mod: QW

## 2022-06-01 PROCEDURE — 93793 ANTICOAG MGMT PT WARFARIN: CPT

## 2022-06-08 ENCOUNTER — APPOINTMENT (OUTPATIENT)
Dept: CARDIOLOGY | Facility: CLINIC | Age: 82
End: 2022-06-08
Payer: MEDICARE

## 2022-06-08 PROCEDURE — 85610 PROTHROMBIN TIME: CPT | Mod: QW

## 2022-06-08 PROCEDURE — 93793 ANTICOAG MGMT PT WARFARIN: CPT

## 2022-06-09 LAB
INR PPP: 2.1 RATIO
INR PPP: 2.1 RATIO
POCT-PROTHROMBIN TIME: 25.8 SECS
POCT-PROTHROMBIN TIME: 25.8 SECS
QUALITY CONTROL: YES
QUALITY CONTROL: YES

## 2022-06-22 ENCOUNTER — APPOINTMENT (OUTPATIENT)
Dept: CARDIOLOGY | Facility: CLINIC | Age: 82
End: 2022-06-22

## 2022-06-22 VITALS — HEART RATE: 73 BPM | SYSTOLIC BLOOD PRESSURE: 165 MMHG | DIASTOLIC BLOOD PRESSURE: 76 MMHG | RESPIRATION RATE: 18 BRPM

## 2022-06-22 LAB
INR PPP: 2.4 RATIO
POCT-PROTHROMBIN TIME: 28.2 SECS
QUALITY CONTROL: YES

## 2022-06-22 PROCEDURE — 85610 PROTHROMBIN TIME: CPT | Mod: QW

## 2022-06-22 PROCEDURE — 93793 ANTICOAG MGMT PT WARFARIN: CPT

## 2022-07-15 ENCOUNTER — APPOINTMENT (OUTPATIENT)
Dept: CARDIOLOGY | Facility: CLINIC | Age: 82
End: 2022-07-15

## 2022-07-15 VITALS — RESPIRATION RATE: 18 BRPM | HEART RATE: 69 BPM | DIASTOLIC BLOOD PRESSURE: 70 MMHG | SYSTOLIC BLOOD PRESSURE: 156 MMHG

## 2022-07-15 LAB
INR PPP: 2.7 RATIO
POCT-PROTHROMBIN TIME: 31.8 SECS
QUALITY CONTROL: YES

## 2022-07-15 PROCEDURE — 93793 ANTICOAG MGMT PT WARFARIN: CPT

## 2022-08-03 ENCOUNTER — APPOINTMENT (OUTPATIENT)
Dept: CARDIOLOGY | Facility: CLINIC | Age: 82
End: 2022-08-03

## 2022-08-03 PROCEDURE — 93793 ANTICOAG MGMT PT WARFARIN: CPT

## 2022-08-03 PROCEDURE — 85610 PROTHROMBIN TIME: CPT | Mod: QW

## 2022-08-24 LAB
INR PPP: 2.5 RATIO
POCT-PROTHROMBIN TIME: 30.2 SECS
QUALITY CONTROL: YES

## 2022-08-30 ENCOUNTER — APPOINTMENT (OUTPATIENT)
Dept: ORTHOPEDIC SURGERY | Facility: CLINIC | Age: 82
End: 2022-08-30

## 2022-08-30 VITALS
HEART RATE: 85 BPM | SYSTOLIC BLOOD PRESSURE: 153 MMHG | WEIGHT: 167 LBS | BODY MASS INDEX: 28.51 KG/M2 | DIASTOLIC BLOOD PRESSURE: 81 MMHG | HEIGHT: 64 IN

## 2022-08-30 PROCEDURE — 99214 OFFICE O/P EST MOD 30 MIN: CPT | Mod: 25

## 2022-08-30 PROCEDURE — 20610 DRAIN/INJ JOINT/BURSA W/O US: CPT | Mod: RT

## 2022-08-30 NOTE — HISTORY OF PRESENT ILLNESS
[Pain Location] : pain [Stable] : stable [4] : a current pain level of 4/10 [6] : a maximum pain level of 6/10 [de-identified] : 81 y/o F presents with right knee pain. She reports instability with stairs and squatting. She saw Dr. Bautista in 2016 and 2017 for right knee pain. In 2016, she received a Gel-One injection. In 2017, she received Monovisc injection. She notes about 2 years of relief with these injections. Her pain is in the anterior knee. She fell in a hole and fractured her right ankle in the beginning of October. She is s/p ORIF of the right ankle with Dr. Montilla on 10/26/2021. She is currently in PT for the ankle. \par pt had cortisone injection in 2/2022 and it helped for 7 M\par \par \par KARLI COREY presents with pain. Pain levels include a current pain level of 5/10, a minimum pain level of 2/10 and a maximum pain level of 7/10. \par

## 2022-08-30 NOTE — PHYSICAL EXAM
[de-identified] : GENERAL APPEARANCE: Well nourished and hydrated, pleasant, alert, and oriented x 3. Appears their stated age. \par HEENT: Normocephalic, extraocular eye motion intact. Nasal septum midline. Oral cavity clear. External auditory canal clear. \par RESPIRATORY: Breath sounds clear and audible in all lobes. No wheezing, No accessory muscle use.\par CARDIOVASCULAR: No apparent abnormalities. No lower leg edema. No varicosities. Pedal pulses are palpable.\par NEUROLOGIC: Sensation is normal, no muscle weakness in the upper or lower extremities.\par DERMATOLOGIC: No apparent skin lesions, moist, warm, no rash.\par SPINE: Cervical spine appears normal and moves freely; thoracic spine appears normal and moves freely; lumbosacral spine appears normal and moves freely, normal, nontender.\par MUSCULOSKELETAL: Hands, wrists, and elbows are normal and move freely, shoulders are normal and move freely. \par Musculoskeletal:. Right knee exam shows ROM 0-120 degrees, slight effusion, tenderness over knee cap. \par 5/5 motor strength in bilateral lower extremities. Sensory: Intact in bilateral lower extremities. DTRs: Biceps, brachioradialis, triceps, patellar, ankle and plantar 2+ and symmetric bilaterally. Pulses: dorsalis pedis, posterior tibial, femoral, popliteal, and radial 2+ and symmetric bilaterally. \par Constitutional: Alert and in no acute distress, but well-appearing. \par

## 2022-08-30 NOTE — DISCUSSION/SUMMARY
[de-identified] : Medication risks reviewed. Surgical risks reviewed. 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 Conservative therapy and surgical options discussed in detail with the patient. Based on imaging, she is a candidate for a right TKA. She had injections in 2016 and 2017 and we encourage her to continue with them at this time. The injections we talked about were cortisone injections and HA injections. Today she opted for a right knee cortisone injection since the last injection provided 7 months of relief. . She will follow up in three months for a repeat cortisone injection if needed. \par \par The patient is a 82 year individual with end stage arthritis of their right knee joint. Based upon the patient's continued symptoms and failure to respond to conservative treatment I have recommended a right total knee arthroplasty for this patient. A long discussion took place with the patient describing what a total joint replacement is and what the procedure would entail. A total knee arthroplasty model, similar to the implant that was used during the operation, was utilized to demonstrate and to discuss the various bearing surfaces of the implants. The hospitalization and post-operative care and rehabilitation were also discussed. The use of perioperative antibiotics and DVT prophylaxis were discussed. The risk, benefits and alternatives to a surgical intervention were discussed at length with the patient. The patient was also advised of risks related to the medical comorbidities, elevated body mass index (BMI), and smoking where applicable. We discussed how to reduce modifiable risk factors and encouraged smoking cessation were applicable.. A lengthy discussion took place to review the most common complications including but not limited to: deep vein thrombosis, pulmonary embolus, heart attack, stroke, infection, wound breakdown, numbness, damage to nerves, tendon, muscles, arteries or other blood vessels, death and other possible complications from anesthesia. The patient was told that we will take steps to minimize these risks by using sterile technique, antibiotics and DVT prophylaxis when appropriate and follow the patient postoperatively in the office setting to monitor progress. The possibility of recurrent pain, no improvement in pain and actual worsening of pain were also discussed with the patient.\par The discharge plan of care focused on the patient going home following surgery. The patient was encouraged to make the necessary arrangements to have someone stay with them when they are discharged home. Following discharge, a home care nurse was to the patient. The home care nurse would open the patient’s home care case and request home physical therapy services. Home physical therapy was to commence following discharge provided it was appropriate and covered by the health insurance benefit plan. \par The benefits of surgery were discussed with the patient including the potential for improving her current clinical condition through operative intervention. Alternatives to surgical intervention including continued conservative management were also discussed in detail. All questions were answered to the satisfaction of the patient. The treatment plan of care, as well as a model of a total knee arthroplasty equivalent to the one that will be used for their total joint replacement, was shared with the patient. The patient agreed to the plan of care as well as the use of implants in their total joint replacement. \par

## 2022-08-30 NOTE — PROCEDURE
[de-identified] : I injected the patient's right knee today with cortisone for osteoarthritis.\par \par I discussed at length with the patient the planned steroid and lidocaine injection. The risks, benefits, convalescence and alternatives were reviewed. The possible side effects discussed included but were not limited to: pain, swelling, heat, bleeding, and redness. Symptoms are generally mild but if they are extensive then contact the office. Giving pain relievers by mouth such as NSAIDs or Tylenol can generally treat the reactions to steroid and lidocaine. Rare cases of infection have been noted. Rash, hives and itching may occur post injection. If you have muscle pain or cramps, flushing and or swelling of the face, rapid heart beat, nausea, dizziness, fever, chills, headache, difficulty breathing, swelling in the arms or legs, or have a prickly feeling of your skin, contact a health care provider immediately. Following this discussion, the knee was prepped with Alcohol and under sterile condition the 80 mg Depo-Medrol and 6 cc Lidocaine injection was performed with a 20 gauge needle through a superolateral injection site. The needle was introduced into the joint, aspiration was performed to ensure intra-articular placement and the medication was injected. Upon withdrawal of the needle the site was cleaned with alcohol and a band aid applied. The patient tolerated the injection well and there were no adverse effects. Post injection instructions included no strenuous activity for 24 hours, cryotherapy and if there are any adverse effects to contact the office. \par  \par

## 2022-09-02 ENCOUNTER — APPOINTMENT (OUTPATIENT)
Dept: CARDIOLOGY | Facility: CLINIC | Age: 82
End: 2022-09-02

## 2022-09-02 PROCEDURE — 93793 ANTICOAG MGMT PT WARFARIN: CPT

## 2022-09-02 PROCEDURE — 85610 PROTHROMBIN TIME: CPT | Mod: QW

## 2022-09-07 LAB
INR PPP: 3.1 RATIO
POCT-PROTHROMBIN TIME: 37.6 SECS
QUALITY CONTROL: YES

## 2022-10-04 ENCOUNTER — APPOINTMENT (OUTPATIENT)
Dept: CARDIOLOGY | Facility: CLINIC | Age: 82
End: 2022-10-04

## 2022-10-04 VITALS — RESPIRATION RATE: 18 BRPM | HEART RATE: 73 BPM | SYSTOLIC BLOOD PRESSURE: 138 MMHG | DIASTOLIC BLOOD PRESSURE: 80 MMHG

## 2022-10-04 PROCEDURE — 85610 PROTHROMBIN TIME: CPT | Mod: QW

## 2022-10-04 PROCEDURE — 93793 ANTICOAG MGMT PT WARFARIN: CPT

## 2022-10-06 LAB
INR PPP: 2.7 RATIO
POCT-PROTHROMBIN TIME: 32.6 SECS
QUALITY CONTROL: YES

## 2022-11-01 ENCOUNTER — APPOINTMENT (OUTPATIENT)
Dept: CARDIOLOGY | Facility: CLINIC | Age: 82
End: 2022-11-01

## 2022-11-01 VITALS — HEART RATE: 79 BPM | SYSTOLIC BLOOD PRESSURE: 151 MMHG | RESPIRATION RATE: 18 BRPM | DIASTOLIC BLOOD PRESSURE: 75 MMHG

## 2022-11-01 LAB
INR PPP: 2 RATIO
POCT-PROTHROMBIN TIME: 23.5 SECS
QUALITY CONTROL: YES

## 2022-11-01 PROCEDURE — 93793 ANTICOAG MGMT PT WARFARIN: CPT

## 2022-11-01 PROCEDURE — 85610 PROTHROMBIN TIME: CPT | Mod: QW

## 2022-11-29 ENCOUNTER — APPOINTMENT (OUTPATIENT)
Dept: CARDIOLOGY | Facility: CLINIC | Age: 82
End: 2022-11-29

## 2022-11-29 PROCEDURE — 93793 ANTICOAG MGMT PT WARFARIN: CPT

## 2022-11-29 PROCEDURE — 85610 PROTHROMBIN TIME: CPT | Mod: QW

## 2022-12-08 LAB
INR PPP: 2 RATIO
POCT-PROTHROMBIN TIME: 24 SECS
QUALITY CONTROL: YES

## 2023-01-03 ENCOUNTER — APPOINTMENT (OUTPATIENT)
Dept: CARDIOLOGY | Facility: CLINIC | Age: 83
End: 2023-01-03

## 2023-01-09 NOTE — ED STATDOCS - PRINCIPAL DIAGNOSIS
0900   preparing for emergent intubation. 0905  1 mg vesed given ivp. Being ambued. 6333  etomidate and rocuronium given IVP.     0912  2mg versed given. Levo started at 5 mcgs. Intubated with 7.5 OETT at 24cm at teeth. 0919  levo up to 10 per Dr. Kelley Moreno. 0920  triple lumen central line placed by Dr. Kelley Moreno. 8106  cxr done at bedside. Dr. Kelley Moreno at bedside. Levo down to 8.      1030  palliative care at bedside. 1330  IR at bedside,placing right IJ Martna dialysis catheter. 1415  portable cxr at bedside. 1520  BP low with hemodialysis. Will increase levo per Dr. Alice Carney. Levo up to 10, and fentanyl drip at 150.     1700 levo back down to 7 now that HD completed. 1900  nephew at [facility]. Belonging bag given to him as requested by patients daughter christin. Cellulitis of left thigh

## 2023-01-11 ENCOUNTER — APPOINTMENT (OUTPATIENT)
Dept: CARDIOLOGY | Facility: CLINIC | Age: 83
End: 2023-01-11
Payer: MEDICARE

## 2023-01-11 LAB
INR PPP: 1.9 RATIO
POCT-PROTHROMBIN TIME: 23.3 SECS
QUALITY CONTROL: YES

## 2023-01-11 PROCEDURE — 93793 ANTICOAG MGMT PT WARFARIN: CPT

## 2023-01-11 PROCEDURE — 85610 PROTHROMBIN TIME: CPT | Mod: QW

## 2023-01-19 ENCOUNTER — APPOINTMENT (OUTPATIENT)
Dept: CARDIOLOGY | Facility: CLINIC | Age: 83
End: 2023-01-19
Payer: MEDICARE

## 2023-01-19 LAB
INR PPP: 2.3 RATIO
POCT-PROTHROMBIN TIME: 28.1 SECS
QUALITY CONTROL: YES

## 2023-01-19 PROCEDURE — 85610 PROTHROMBIN TIME: CPT | Mod: QW

## 2023-01-19 PROCEDURE — 93793 ANTICOAG MGMT PT WARFARIN: CPT

## 2023-02-17 ENCOUNTER — APPOINTMENT (OUTPATIENT)
Dept: CARDIOLOGY | Facility: CLINIC | Age: 83
End: 2023-02-17
Payer: MEDICARE

## 2023-02-17 VITALS — DIASTOLIC BLOOD PRESSURE: 74 MMHG | SYSTOLIC BLOOD PRESSURE: 136 MMHG | HEART RATE: 75 BPM | RESPIRATION RATE: 18 BRPM

## 2023-02-17 LAB
INR PPP: 3.2 RATIO
POCT-PROTHROMBIN TIME: 38.3 SECS
QUALITY CONTROL: YES

## 2023-02-17 PROCEDURE — 93793 ANTICOAG MGMT PT WARFARIN: CPT

## 2023-02-17 PROCEDURE — 85610 PROTHROMBIN TIME: CPT | Mod: QW

## 2023-03-03 ENCOUNTER — APPOINTMENT (OUTPATIENT)
Dept: CARDIOLOGY | Facility: CLINIC | Age: 83
End: 2023-03-03
Payer: MEDICARE

## 2023-03-03 PROCEDURE — 93793 ANTICOAG MGMT PT WARFARIN: CPT

## 2023-03-03 PROCEDURE — 85610 PROTHROMBIN TIME: CPT | Mod: QW

## 2023-03-04 LAB
INR PPP: 2.3 RATIO
POCT-PROTHROMBIN TIME: 27.3 SECS
QUALITY CONTROL: YES

## 2023-04-03 ENCOUNTER — APPOINTMENT (OUTPATIENT)
Dept: CARDIOLOGY | Facility: CLINIC | Age: 83
End: 2023-04-03
Payer: MEDICARE

## 2023-04-03 PROCEDURE — 85610 PROTHROMBIN TIME: CPT | Mod: QW

## 2023-04-03 PROCEDURE — 93793 ANTICOAG MGMT PT WARFARIN: CPT

## 2023-04-04 LAB
INR PPP: 2 RATIO
POCT-PROTHROMBIN TIME: 23.7 SECS
QUALITY CONTROL: YES

## 2023-05-01 ENCOUNTER — APPOINTMENT (OUTPATIENT)
Dept: CARDIOLOGY | Facility: CLINIC | Age: 83
End: 2023-05-01
Payer: MEDICARE

## 2023-05-01 DIAGNOSIS — Z41.1 ENCOUNTER FOR COSMETIC SURGERY: ICD-10-CM

## 2023-05-01 LAB
INR PPP: 2.4 RATIO
POCT-PROTHROMBIN TIME: 28.5 SECS
QUALITY CONTROL: YES

## 2023-05-01 PROCEDURE — 93793 ANTICOAG MGMT PT WARFARIN: CPT

## 2023-05-01 PROCEDURE — 85610 PROTHROMBIN TIME: CPT | Mod: QW

## 2023-05-30 ENCOUNTER — APPOINTMENT (OUTPATIENT)
Dept: CARDIOLOGY | Facility: CLINIC | Age: 83
End: 2023-05-30
Payer: MEDICARE

## 2023-05-30 PROCEDURE — 93793 ANTICOAG MGMT PT WARFARIN: CPT

## 2023-05-30 PROCEDURE — 85610 PROTHROMBIN TIME: CPT | Mod: QW

## 2023-06-01 LAB
INR PPP: 2.4 RATIO
POCT-PROTHROMBIN TIME: 29.1 SECS
QUALITY CONTROL: YES

## 2023-06-28 ENCOUNTER — APPOINTMENT (OUTPATIENT)
Dept: CARDIOLOGY | Facility: CLINIC | Age: 83
End: 2023-06-28
Payer: MEDICARE

## 2023-06-28 LAB
INR PPP: 1.8 RATIO
POCT-PROTHROMBIN TIME: 21.2 SECS
QUALITY CONTROL: YES

## 2023-06-28 PROCEDURE — 85610 PROTHROMBIN TIME: CPT | Mod: QW

## 2023-06-28 PROCEDURE — 93793 ANTICOAG MGMT PT WARFARIN: CPT

## 2023-07-12 ENCOUNTER — APPOINTMENT (OUTPATIENT)
Dept: CARDIOLOGY | Facility: CLINIC | Age: 83
End: 2023-07-12
Payer: MEDICARE

## 2023-07-12 LAB
INR PPP: 1.8 RATIO
POCT-PROTHROMBIN TIME: 21.8 SECS
QUALITY CONTROL: YES

## 2023-07-12 PROCEDURE — 93793 ANTICOAG MGMT PT WARFARIN: CPT

## 2023-07-12 PROCEDURE — 85610 PROTHROMBIN TIME: CPT | Mod: QW

## 2023-07-19 ENCOUNTER — APPOINTMENT (OUTPATIENT)
Dept: CARDIOLOGY | Facility: CLINIC | Age: 83
End: 2023-07-19
Payer: MEDICARE

## 2023-07-19 LAB
INR PPP: 2.6 RATIO
POCT-PROTHROMBIN TIME: 31.4 SECS
QUALITY CONTROL: YES

## 2023-07-19 PROCEDURE — 93793 ANTICOAG MGMT PT WARFARIN: CPT

## 2023-07-19 PROCEDURE — 85610 PROTHROMBIN TIME: CPT | Mod: QW

## 2023-07-28 ENCOUNTER — APPOINTMENT (OUTPATIENT)
Dept: VASCULAR SURGERY | Facility: CLINIC | Age: 83
End: 2023-07-28
Payer: MEDICARE

## 2023-07-28 VITALS
WEIGHT: 167 LBS | DIASTOLIC BLOOD PRESSURE: 84 MMHG | HEART RATE: 80 BPM | HEIGHT: 64 IN | BODY MASS INDEX: 28.51 KG/M2 | SYSTOLIC BLOOD PRESSURE: 136 MMHG | OXYGEN SATURATION: 97 %

## 2023-07-28 DIAGNOSIS — R60.0 LOCALIZED EDEMA: ICD-10-CM

## 2023-07-28 PROCEDURE — 99203 OFFICE O/P NEW LOW 30 MIN: CPT

## 2023-07-28 RX ORDER — OMEPRAZOLE 20 MG/1
20 CAPSULE, DELAYED RELEASE ORAL DAILY
Refills: 0 | Status: ACTIVE | COMMUNITY

## 2023-07-28 RX ORDER — LISINOPRIL 5 MG/1
5 TABLET ORAL
Refills: 0 | Status: DISCONTINUED | COMMUNITY
End: 2023-07-28

## 2023-07-28 RX ORDER — METHYLCELLULOSE 500 MG/1
TABLET ORAL
Refills: 0 | Status: ACTIVE | COMMUNITY

## 2023-07-28 RX ORDER — FEXOFENADINE HCL 60 MG
TABLET ORAL
Refills: 0 | Status: ACTIVE | COMMUNITY

## 2023-07-28 RX ORDER — CHOLECALCIFEROL (VITAMIN D3) 25 MCG
TABLET ORAL
Refills: 0 | Status: ACTIVE | COMMUNITY

## 2023-07-28 RX ORDER — IBUPROFEN 200 MG
600 CAPSULE ORAL DAILY
Refills: 0 | Status: ACTIVE | COMMUNITY

## 2023-08-03 ENCOUNTER — APPOINTMENT (OUTPATIENT)
Dept: ULTRASOUND IMAGING | Facility: CLINIC | Age: 83
End: 2023-08-03

## 2023-08-03 ENCOUNTER — APPOINTMENT (OUTPATIENT)
Dept: VASCULAR SURGERY | Facility: CLINIC | Age: 83
End: 2023-08-03
Payer: MEDICARE

## 2023-08-03 PROCEDURE — 93970 EXTREMITY STUDY: CPT

## 2023-08-04 NOTE — ASSESSMENT
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 Despite efforts of compression (20-30mmHg), elevation and exercise for over 4 weeks symptoms continue to persist and swelling extends proximally into the truncal region, noting truncal lymphedema. Early signs of hyperpigmentation are noted.  A total of 30 minutes was spent with patient and coordinating care

## 2023-08-04 NOTE — ADDENDUM
[FreeTextEntry1] : Patient attempted use of a basic pump (entre [id number] set at 30mmHg for 20 minutes) at the time of the demonstration on 8/3/23 yet had to be removed due to skin sensitivity and worsening of current truncal lymphedema. Patient also attempted self MLD with no improvement. Now recommending the Flexitouch to appropriately treat the full leg and core.

## 2023-08-04 NOTE — HISTORY OF PRESENT ILLNESS
[FreeTextEntry1] : 84 yo female PMHx clotting disorder presents for evaluation of BLE edema, worse on left. Pt has had BLE edema for many years. Pt had her first DVT in 1965 during her first pregnancy. She another DVT with her second pregnancy. She had ITP and splenectomy 1988. She has been on and off Coumadin for several years. She was diagnosed with anticardiolipin in 2012 and has been on Coumadin since then. She uses compression stockings in the winter. She has not tried a lymphedema massage pump. She denies any pain in the legs. She was told by a plastic surgeon that she has issues with the valves in her legs.

## 2023-08-04 NOTE — PHYSICAL EXAM
[2+] : left 2+ [Ankle Swelling (On Exam)] : present [Ankle Swelling Bilaterally] : bilaterally  [Varicose Veins Of Lower Extremities] : bilaterally [Ankle Swelling On The Left] : moderate [de-identified] : NAD. well appearing  [FreeTextEntry1] : PT non-palpable due to edema

## 2023-08-17 ENCOUNTER — APPOINTMENT (OUTPATIENT)
Dept: CARDIOLOGY | Facility: CLINIC | Age: 83
End: 2023-08-17
Payer: MEDICARE

## 2023-08-17 LAB
INR PPP: 1.4 RATIO
POCT-PROTHROMBIN TIME: 17.2 SECS
QUALITY CONTROL: YES

## 2023-08-17 PROCEDURE — 85610 PROTHROMBIN TIME: CPT | Mod: QW

## 2023-08-17 PROCEDURE — 93793 ANTICOAG MGMT PT WARFARIN: CPT

## 2023-08-24 ENCOUNTER — APPOINTMENT (OUTPATIENT)
Dept: CARDIOLOGY | Facility: CLINIC | Age: 83
End: 2023-08-24
Payer: MEDICARE

## 2023-08-24 PROCEDURE — 85610 PROTHROMBIN TIME: CPT | Mod: QW

## 2023-08-24 PROCEDURE — 93793 ANTICOAG MGMT PT WARFARIN: CPT

## 2023-08-28 LAB
INR PPP: 2.6 RATIO
POCT-PROTHROMBIN TIME: 30.8 SECS
QUALITY CONTROL: YES

## 2023-08-31 ENCOUNTER — APPOINTMENT (OUTPATIENT)
Dept: CARDIOLOGY | Facility: CLINIC | Age: 83
End: 2023-08-31
Payer: MEDICARE

## 2023-08-31 PROCEDURE — 93793 ANTICOAG MGMT PT WARFARIN: CPT

## 2023-08-31 PROCEDURE — 85610 PROTHROMBIN TIME: CPT | Mod: QW

## 2023-09-01 LAB
INR PPP: 2.8 RATIO
POCT-PROTHROMBIN TIME: 33.2 SECS
QUALITY CONTROL: YES

## 2023-09-14 ENCOUNTER — APPOINTMENT (OUTPATIENT)
Dept: CARDIOLOGY | Facility: CLINIC | Age: 83
End: 2023-09-14
Payer: MEDICARE

## 2023-09-14 LAB
INR PPP: 2.1 RATIO
POCT-PROTHROMBIN TIME: 25.3 SECS
QUALITY CONTROL: YES

## 2023-09-14 PROCEDURE — 85610 PROTHROMBIN TIME: CPT | Mod: QW

## 2023-09-14 PROCEDURE — 93793 ANTICOAG MGMT PT WARFARIN: CPT

## 2023-10-05 RX ORDER — WARFARIN 5 MG/1
5 TABLET ORAL
Qty: 135 | Refills: 3 | Status: ACTIVE | COMMUNITY
Start: 2023-10-05 | End: 1900-01-01

## 2023-10-26 ENCOUNTER — APPOINTMENT (OUTPATIENT)
Dept: CARDIOLOGY | Facility: CLINIC | Age: 83
End: 2023-10-26

## 2023-11-09 ENCOUNTER — APPOINTMENT (OUTPATIENT)
Dept: CARDIOLOGY | Facility: CLINIC | Age: 83
End: 2023-11-09
Payer: MEDICARE

## 2023-11-09 LAB
INR PPP: 3.4 RATIO
POCT-PROTHROMBIN TIME: 41.1 SECS
QUALITY CONTROL: YES

## 2023-11-09 PROCEDURE — 93793 ANTICOAG MGMT PT WARFARIN: CPT

## 2023-11-09 PROCEDURE — 85610 PROTHROMBIN TIME: CPT | Mod: QW

## 2023-11-27 ENCOUNTER — APPOINTMENT (OUTPATIENT)
Dept: CARDIOLOGY | Facility: CLINIC | Age: 83
End: 2023-11-27
Payer: MEDICARE

## 2023-11-27 LAB
INR PPP: 3.5 RATIO
POCT-PROTHROMBIN TIME: 41.5 SECS
QUALITY CONTROL: YES

## 2023-11-27 PROCEDURE — 85610 PROTHROMBIN TIME: CPT | Mod: QW

## 2023-11-27 PROCEDURE — 93793 ANTICOAG MGMT PT WARFARIN: CPT

## 2023-12-07 ENCOUNTER — APPOINTMENT (OUTPATIENT)
Dept: CARDIOLOGY | Facility: CLINIC | Age: 83
End: 2023-12-07
Payer: MEDICARE

## 2023-12-07 LAB
INR PPP: 2.9 RATIO
POCT-PROTHROMBIN TIME: 35.3 SECS
QUALITY CONTROL: YES

## 2023-12-07 PROCEDURE — 85610 PROTHROMBIN TIME: CPT | Mod: QW

## 2023-12-07 PROCEDURE — 93793 ANTICOAG MGMT PT WARFARIN: CPT

## 2023-12-11 ENCOUNTER — APPOINTMENT (OUTPATIENT)
Dept: DERMATOLOGY | Facility: CLINIC | Age: 83
End: 2023-12-11
Payer: MEDICARE

## 2023-12-11 DIAGNOSIS — L57.0 ACTINIC KERATOSIS: ICD-10-CM

## 2023-12-11 DIAGNOSIS — L82.1 OTHER SEBORRHEIC KERATOSIS: ICD-10-CM

## 2023-12-11 DIAGNOSIS — L81.4 OTHER MELANIN HYPERPIGMENTATION: ICD-10-CM

## 2023-12-11 PROCEDURE — 99213 OFFICE O/P EST LOW 20 MIN: CPT | Mod: 25

## 2023-12-11 PROCEDURE — 17003 DESTRUCT PREMALG LES 2-14: CPT | Mod: 59

## 2023-12-11 PROCEDURE — 17000 DESTRUCT PREMALG LESION: CPT

## 2023-12-11 RX ORDER — HYDROCHLOROTHIAZIDE 12.5 MG/1
12.5 CAPSULE ORAL DAILY
Refills: 0 | Status: DISCONTINUED | COMMUNITY
End: 2023-12-11

## 2023-12-11 RX ORDER — WARFARIN 5 MG/1
5 TABLET ORAL DAILY
Qty: 90 | Refills: 3 | Status: DISCONTINUED | COMMUNITY
Start: 2021-06-30 | End: 2023-12-11

## 2023-12-12 ENCOUNTER — APPOINTMENT (OUTPATIENT)
Dept: ORTHOPEDIC SURGERY | Facility: CLINIC | Age: 83
End: 2023-12-12
Payer: MEDICARE

## 2023-12-12 VITALS
BODY MASS INDEX: 28.51 KG/M2 | SYSTOLIC BLOOD PRESSURE: 174 MMHG | DIASTOLIC BLOOD PRESSURE: 85 MMHG | HEIGHT: 64 IN | WEIGHT: 167 LBS | HEART RATE: 76 BPM

## 2023-12-12 DIAGNOSIS — M17.12 UNILATERAL PRIMARY OSTEOARTHRITIS, LEFT KNEE: ICD-10-CM

## 2023-12-12 DIAGNOSIS — M17.11 UNILATERAL PRIMARY OSTEOARTHRITIS, RIGHT KNEE: ICD-10-CM

## 2023-12-12 PROCEDURE — 20610 DRAIN/INJ JOINT/BURSA W/O US: CPT | Mod: 50

## 2023-12-12 PROCEDURE — 73562 X-RAY EXAM OF KNEE 3: CPT | Mod: 50

## 2023-12-21 ENCOUNTER — APPOINTMENT (OUTPATIENT)
Dept: CARDIOLOGY | Facility: CLINIC | Age: 83
End: 2023-12-21

## 2023-12-21 ENCOUNTER — APPOINTMENT (OUTPATIENT)
Dept: CARDIOLOGY | Facility: CLINIC | Age: 83
End: 2023-12-21
Payer: MEDICARE

## 2023-12-21 DIAGNOSIS — Z86.718 PERSONAL HISTORY OF OTHER VENOUS THROMBOSIS AND EMBOLISM: ICD-10-CM

## 2023-12-21 LAB
INR PPP: 2.9 RATIO
QUALITY CONTROL: YES

## 2023-12-21 PROCEDURE — 93793 ANTICOAG MGMT PT WARFARIN: CPT

## 2023-12-21 PROCEDURE — 85610 PROTHROMBIN TIME: CPT | Mod: QW

## 2023-12-28 ENCOUNTER — APPOINTMENT (OUTPATIENT)
Dept: CARDIOLOGY | Facility: CLINIC | Age: 83
End: 2023-12-28

## 2023-12-28 ENCOUNTER — APPOINTMENT (OUTPATIENT)
Dept: CARDIOLOGY | Facility: CLINIC | Age: 83
End: 2023-12-28
Payer: MEDICARE

## 2023-12-28 LAB
INR PPP: 3.7 RATIO
QUALITY CONTROL: YES

## 2023-12-28 PROCEDURE — 85610 PROTHROMBIN TIME: CPT | Mod: QW

## 2023-12-28 PROCEDURE — 93793 ANTICOAG MGMT PT WARFARIN: CPT

## 2024-01-04 ENCOUNTER — APPOINTMENT (OUTPATIENT)
Dept: CARDIOLOGY | Facility: CLINIC | Age: 84
End: 2024-01-04
Payer: MEDICARE

## 2024-01-04 ENCOUNTER — APPOINTMENT (OUTPATIENT)
Dept: CARDIOLOGY | Facility: CLINIC | Age: 84
End: 2024-01-04

## 2024-01-04 LAB
INR PPP: 2.6 RATIO
QUALITY CONTROL: YES

## 2024-01-04 PROCEDURE — 85610 PROTHROMBIN TIME: CPT | Mod: QW

## 2024-01-04 PROCEDURE — 93793 ANTICOAG MGMT PT WARFARIN: CPT

## 2024-01-11 ENCOUNTER — APPOINTMENT (OUTPATIENT)
Dept: CARDIOLOGY | Facility: CLINIC | Age: 84
End: 2024-01-11

## 2024-01-11 ENCOUNTER — APPOINTMENT (OUTPATIENT)
Dept: CARDIOLOGY | Facility: CLINIC | Age: 84
End: 2024-01-11
Payer: MEDICARE

## 2024-01-11 DIAGNOSIS — R76.0 RAISED ANTIBODY TITER: ICD-10-CM

## 2024-01-11 DIAGNOSIS — Z79.01 LONG TERM (CURRENT) USE OF ANTICOAGULANTS: ICD-10-CM

## 2024-01-11 LAB
INR PPP: 2.9 RATIO
QUALITY CONTROL: YES

## 2024-01-11 PROCEDURE — 85610 PROTHROMBIN TIME: CPT | Mod: QW

## 2024-01-11 PROCEDURE — 93793 ANTICOAG MGMT PT WARFARIN: CPT

## 2024-01-18 ENCOUNTER — APPOINTMENT (OUTPATIENT)
Dept: CARDIOLOGY | Facility: CLINIC | Age: 84
End: 2024-01-18
Payer: MEDICARE

## 2024-01-18 LAB
INR PPP: 2.7 RATIO
QUALITY CONTROL: YES

## 2024-01-18 PROCEDURE — 93793 ANTICOAG MGMT PT WARFARIN: CPT

## 2024-01-18 PROCEDURE — 85610 PROTHROMBIN TIME: CPT | Mod: QW

## 2024-01-25 ENCOUNTER — APPOINTMENT (OUTPATIENT)
Dept: CARDIOLOGY | Facility: CLINIC | Age: 84
End: 2024-01-25

## 2024-01-25 DIAGNOSIS — Z71.89 OTHER SPECIFIED COUNSELING: ICD-10-CM

## 2024-01-25 LAB
INR PPP: 2.5 RATIO
QUALITY CONTROL: YES

## 2024-02-01 LAB
INR PPP: 2.7 RATIO
QUALITY CONTROL: YES

## 2024-02-08 LAB
INR PPP: 2.4 RATIO
QUALITY CONTROL: YES

## 2024-02-15 LAB
INR PPP: 2.8 RATIO
QUALITY CONTROL: YES

## 2024-02-16 PROBLEM — R76.0 ANTIPHOSPHOLIPID ANTIBODY POSITIVE: Status: ACTIVE | Noted: 2021-05-25

## 2024-02-16 PROBLEM — Z79.01 ANTICOAGULATION MONITORING, INR RANGE 2-3: Status: ACTIVE | Noted: 2021-05-25

## 2024-02-22 LAB
INR PPP: 2 RATIO
QUALITY CONTROL: YES

## 2024-02-22 RX ORDER — WARFARIN 5 MG/1
5 TABLET ORAL
Qty: 90 | Refills: 3 | Status: ACTIVE | COMMUNITY
Start: 2024-02-22 | End: 1900-01-01

## 2024-02-29 LAB
INR PPP: 2.6 RATIO
QUALITY CONTROL: YES

## 2024-03-07 LAB
INR PPP: 2.1 RATIO
QUALITY CONTROL: YES

## 2024-03-14 LAB
INR PPP: 1.9 RATIO
QUALITY CONTROL: YES

## 2024-03-28 LAB
INR PPP: 2.4 RATIO
INR PPP: 2.5 RATIO
QUALITY CONTROL: YES

## 2024-04-04 LAB
INR PPP: 2 RATIO
QUALITY CONTROL: YES

## 2024-04-08 ENCOUNTER — APPOINTMENT (OUTPATIENT)
Dept: DERMATOLOGY | Facility: CLINIC | Age: 84
End: 2024-04-08
Payer: MEDICARE

## 2024-04-08 DIAGNOSIS — L82.0 INFLAMED SEBORRHEIC KERATOSIS: ICD-10-CM

## 2024-04-08 PROCEDURE — 17110 DESTRUCTION B9 LES UP TO 14: CPT

## 2024-04-08 NOTE — HISTORY OF PRESENT ILLNESS
[FreeTextEntry1] : Patient with lesions on the scalp and back. [de-identified] : Irritated and growing.  Itching.  No bleeding.

## 2024-04-08 NOTE — PHYSICAL EXAM
[FreeTextEntry3] : Greasy brown and erythematous plaque of the midback, and papule of the anterior vertex.

## 2024-04-12 LAB
INR PPP: 2.3 RATIO
QUALITY CONTROL: YES

## 2024-04-19 LAB
INR PPP: 2.8 RATIO
QUALITY CONTROL: YES

## 2024-04-29 LAB
INR PPP: 2.6 RATIO
QUALITY CONTROL: YES

## 2024-05-09 LAB
INR PPP: 2 RATIO
QUALITY CONTROL: YES

## 2024-05-16 LAB
INR PPP: 2.4 RATIO
QUALITY CONTROL: YES

## 2024-05-23 LAB
INR PPP: 2.4 RATIO
QUALITY CONTROL: YES

## 2024-05-30 LAB
INR PPP: 2.7 RATIO
QUALITY CONTROL: YES

## 2024-05-31 ENCOUNTER — APPOINTMENT (OUTPATIENT)
Dept: MAMMOGRAPHY | Facility: CLINIC | Age: 84
End: 2024-05-31

## 2024-06-06 LAB
INR PPP: 3.4 RATIO
QUALITY CONTROL: YES

## 2024-06-13 LAB
INR PPP: 2.7 RATIO
QUALITY CONTROL: YES

## 2024-06-20 LAB
INR PPP: 2.7 RATIO
QUALITY CONTROL: YES

## 2024-06-27 LAB
INR PPP: 2.4 RATIO
QUALITY CONTROL: YES

## 2024-07-11 LAB
INR PPP: 1.8 RATIO
INR PPP: 2.3 RATIO
QUALITY CONTROL: YES
QUALITY CONTROL: YES

## 2024-07-18 LAB
INR PPP: 2.4 RATIO
QUALITY CONTROL: YES

## 2024-08-01 LAB
INR PPP: 1.8 RATIO
QUALITY CONTROL: YES

## 2024-08-12 LAB
INR PPP: 1.5 RATIO
QUALITY CONTROL: YES

## 2024-08-15 LAB
INR PPP: 1.9 RATIO
QUALITY CONTROL: YES

## 2024-08-26 LAB
INR PPP: 4.1 RATIO
QUALITY CONTROL: YES

## 2024-08-30 LAB
INR PPP: 2.6 RATIO
QUALITY CONTROL: YES

## 2024-09-09 LAB
INR PPP: 3.4 RATIO
QUALITY CONTROL: YES

## 2024-09-09 NOTE — ED STATDOCS - ENMT NEGATIVE STATEMENT, MLM
DISPLAY PLAN FREE TEXT
Ears: no ear pain and no hearing problems. Nose: no nasal congestion and no nasal drainage. Mouth/Throat: no dysphagia, no hoarseness and no throat pain. Neck: no lumps, no pain, no stiffness and no swollen glands.

## 2024-09-19 LAB
INR PPP: 1.9 RATIO
QUALITY CONTROL: YES

## 2024-09-26 LAB
INR PPP: 2.4 RATIO
QUALITY CONTROL: YES

## 2024-10-02 ENCOUNTER — APPOINTMENT (OUTPATIENT)
Dept: DERMATOLOGY | Facility: CLINIC | Age: 84
End: 2024-10-02
Payer: MEDICARE

## 2024-10-02 DIAGNOSIS — L57.0 ACTINIC KERATOSIS: ICD-10-CM

## 2024-10-02 DIAGNOSIS — Z87.2 PERSONAL HISTORY OF DISEASES OF THE SKIN AND SUBCUTANEOUS TISSUE: ICD-10-CM

## 2024-10-02 DIAGNOSIS — L72.3 SEBACEOUS CYST: ICD-10-CM

## 2024-10-02 DIAGNOSIS — L82.1 OTHER SEBORRHEIC KERATOSIS: ICD-10-CM

## 2024-10-02 DIAGNOSIS — T14.8XXA OTHER INJURY OF UNSPECIFIED BODY REGION, INITIAL ENCOUNTER: ICD-10-CM

## 2024-10-02 DIAGNOSIS — L02.91 CUTANEOUS ABSCESS, UNSPECIFIED: ICD-10-CM

## 2024-10-02 DIAGNOSIS — L85.3 XEROSIS CUTIS: ICD-10-CM

## 2024-10-02 PROCEDURE — 17000 DESTRUCT PREMALG LESION: CPT

## 2024-10-02 PROCEDURE — 99213 OFFICE O/P EST LOW 20 MIN: CPT | Mod: 25

## 2024-10-02 PROCEDURE — 17003 DESTRUCT PREMALG LES 2-14: CPT

## 2024-10-02 RX ORDER — METOPROLOL SUCCINATE 25 MG/1
25 TABLET, EXTENDED RELEASE ORAL
Refills: 0 | Status: ACTIVE | COMMUNITY

## 2024-10-02 RX ORDER — ROSUVASTATIN CALCIUM 5 MG/1
TABLET, FILM COATED ORAL
Refills: 0 | Status: ACTIVE | COMMUNITY

## 2024-10-02 RX ORDER — CLOPIDOGREL 75 MG/1
75 TABLET, FILM COATED ORAL
Refills: 0 | Status: ACTIVE | COMMUNITY

## 2024-10-02 NOTE — PHYSICAL EXAM
[Alert] : alert [Oriented x 3] : ~L oriented x 3 [Well Nourished] : well nourished [Declined] : declined [FreeTextEntry3] : keratotic papule, superior scalp, left forehead x 2, left malar region and right shin (patch).  Greasy tan and brown papules of the face, LE's.

## 2024-10-03 LAB
INR PPP: 2.5 RATIO
QUALITY CONTROL: YES

## 2024-10-10 LAB
INR PPP: 3.2 RATIO
QUALITY CONTROL: YES

## 2024-10-17 LAB
INR PPP: 3.6 RATIO
QUALITY CONTROL: YES

## 2024-10-31 LAB
INR PPP: 2.3 RATIO
INR PPP: 2.5 RATIO
QUALITY CONTROL: YES
QUALITY CONTROL: YES

## 2024-11-07 LAB
INR PPP: 2.2 RATIO
QUALITY CONTROL: YES

## 2024-11-14 LAB
INR PPP: 3.3 RATIO
QUALITY CONTROL: YES

## 2024-11-27 LAB
INR PPP: 3.2 RATIO
QUALITY CONTROL: YES

## 2024-12-06 LAB
INR PPP: 2.8 RATIO
QUALITY CONTROL: YES

## 2024-12-12 LAB
INR PPP: 1.6 RATIO
QUALITY CONTROL: YES

## 2024-12-19 LAB
INR PPP: 2.9 RATIO
QUALITY CONTROL: YES

## 2024-12-23 ENCOUNTER — APPOINTMENT (OUTPATIENT)
Dept: ORTHOPEDIC SURGERY | Facility: CLINIC | Age: 84
End: 2024-12-23

## 2024-12-26 LAB
INR PPP: 3.9 RATIO
QUALITY CONTROL: YES

## 2025-01-02 LAB
INR PPP: 1.9 RATIO
QUALITY CONTROL: YES

## 2025-01-03 ENCOUNTER — APPOINTMENT (OUTPATIENT)
Dept: ORTHOPEDIC SURGERY | Facility: CLINIC | Age: 85
End: 2025-01-03

## 2025-01-09 LAB
INR PPP: 1.8 RATIO
QUALITY CONTROL: YES

## 2025-01-23 LAB
INR PPP: 2.6 RATIO
QUALITY CONTROL: YES

## 2025-01-30 LAB
INR PPP: 2.8 RATIO
QUALITY CONTROL: YES

## 2025-01-31 PROBLEM — M47.816 LUMBAR SPONDYLOSIS: Status: ACTIVE | Noted: 2025-01-31

## 2025-02-04 ENCOUNTER — APPOINTMENT (OUTPATIENT)
Dept: ORTHOPEDIC SURGERY | Facility: CLINIC | Age: 85
End: 2025-02-04
Payer: MEDICARE

## 2025-02-04 VITALS
HEART RATE: 72 BPM | BODY MASS INDEX: 29.02 KG/M2 | HEIGHT: 64 IN | SYSTOLIC BLOOD PRESSURE: 186 MMHG | DIASTOLIC BLOOD PRESSURE: 76 MMHG | WEIGHT: 170 LBS

## 2025-02-04 DIAGNOSIS — M47.816 SPONDYLOSIS W/OUT MYELOPATHY OR RADICULOPATHY, LUMBAR REGION: ICD-10-CM

## 2025-02-04 PROCEDURE — 72100 X-RAY EXAM L-S SPINE 2/3 VWS: CPT

## 2025-02-04 PROCEDURE — 99213 OFFICE O/P EST LOW 20 MIN: CPT

## 2025-02-04 RX ORDER — METHYLPREDNISOLONE 4 MG/1
4 TABLET ORAL
Qty: 1 | Refills: 0 | Status: ACTIVE | COMMUNITY
Start: 2025-02-04 | End: 1900-01-01

## 2025-02-06 LAB
INR PPP: 3.5 RATIO
QUALITY CONTROL: YES

## 2025-02-13 LAB
INR PPP: 2.8 RATIO
QUALITY CONTROL: YES

## 2025-02-20 LAB
INR PPP: 2.3 RATIO
QUALITY CONTROL: YES

## 2025-02-27 LAB
INR PPP: 2.7 RATIO
QUALITY CONTROL: YES

## 2025-02-27 RX ORDER — WARFARIN 5 MG/1
5 TABLET ORAL
Qty: 135 | Refills: 3 | Status: ACTIVE | COMMUNITY
Start: 2025-02-27 | End: 1900-01-01

## 2025-03-06 LAB
INR PPP: 2.7 RATIO
QUALITY CONTROL: YES

## 2025-03-13 LAB
INR PPP: 3.1 RATIO
QUALITY CONTROL: YES

## 2025-03-20 LAB
INR PPP: 2.4 RATIO
QUALITY CONTROL: YES

## 2025-03-27 LAB
INR PPP: 2.9 RATIO
QUALITY CONTROL: YES

## 2025-04-03 LAB
INR PPP: 2.7 RATIO
QUALITY CONTROL: YES

## 2025-04-10 LAB
INR PPP: 2 RATIO
QUALITY CONTROL: YES

## 2025-04-24 LAB
INR PPP: 2.7 RATIO
QUALITY CONTROL: YES

## 2025-05-01 LAB
INR PPP: 3.2 RATIO
QUALITY CONTROL: YES

## 2025-05-08 LAB
INR PPP: 2.3 RATIO
QUALITY CONTROL: YES

## 2025-05-15 LAB
INR PPP: 2.5 RATIO
QUALITY CONTROL: YES

## 2025-05-19 NOTE — ED ADULT TRIAGE NOTE - BRAND OF COVID-19 VACCINATION
[facility] Care Coordination Contact    Received after visit chart from care coordinator.  Completed following tasks: Mailed copy of support plan to member, Mailed health care directive documents, Mailed [place] Choices signature sheet pages 3-4, Mailed Safe Medication Disposal , and Mailed Transition of Care Member Handout    Demi Osorio  Case Management Specialist   [facility]  [phone]        
Moderna dose 1, 2, and 3

## 2025-05-22 LAB
INR PPP: 2.8 RATIO
QUALITY CONTROL: YES

## 2025-05-29 LAB
INR PPP: 2.8 RATIO
QUALITY CONTROL: YES

## 2025-06-03 ENCOUNTER — APPOINTMENT (OUTPATIENT)
Dept: VASCULAR SURGERY | Facility: CLINIC | Age: 85
End: 2025-06-03

## 2025-06-19 LAB
INR PPP: 2.3 RATIO
QUALITY CONTROL: YES

## 2025-06-23 RX ORDER — NITROFURANTOIN MACROCRYSTAL 100 MG
1 CAPSULE ORAL
Refills: 0 | DISCHARGE
Start: 2025-06-23 | End: 2025-06-30

## 2025-06-26 LAB
INR PPP: 2.6 RATIO
QUALITY CONTROL: YES

## 2025-07-03 LAB
INR PPP: 2.5 RATIO
QUALITY CONTROL: YES

## 2025-07-08 ENCOUNTER — INPATIENT (INPATIENT)
Facility: HOSPITAL | Age: 85
LOS: 1 days | Discharge: HOME | DRG: 153 | End: 2025-07-10
Attending: STUDENT IN AN ORGANIZED HEALTH CARE EDUCATION/TRAINING PROGRAM | Admitting: FAMILY MEDICINE
Payer: MEDICARE

## 2025-07-08 VITALS
HEART RATE: 65 BPM | OXYGEN SATURATION: 95 % | DIASTOLIC BLOOD PRESSURE: 93 MMHG | SYSTOLIC BLOOD PRESSURE: 140 MMHG | WEIGHT: 169.98 LBS | RESPIRATION RATE: 18 BRPM | HEIGHT: 64 IN | TEMPERATURE: 98 F

## 2025-07-08 DIAGNOSIS — R42 DIZZINESS AND GIDDINESS: ICD-10-CM

## 2025-07-08 DIAGNOSIS — Z90.49 ACQUIRED ABSENCE OF OTHER SPECIFIED PARTS OF DIGESTIVE TRACT: Chronic | ICD-10-CM

## 2025-07-08 DIAGNOSIS — Z90.81 ACQUIRED ABSENCE OF SPLEEN: Chronic | ICD-10-CM

## 2025-07-08 LAB
ALBUMIN SERPL ELPH-MCNC: 3.3 G/DL — SIGNIFICANT CHANGE UP (ref 3.3–5)
ALP SERPL-CCNC: 65 U/L — SIGNIFICANT CHANGE UP (ref 40–120)
ALT FLD-CCNC: 24 U/L — SIGNIFICANT CHANGE UP (ref 12–78)
ANION GAP SERPL CALC-SCNC: 4 MMOL/L — LOW (ref 5–17)
APPEARANCE UR: CLEAR — SIGNIFICANT CHANGE UP
APTT BLD: 32.3 SEC — SIGNIFICANT CHANGE UP (ref 26.1–36.8)
AST SERPL-CCNC: 17 U/L — SIGNIFICANT CHANGE UP (ref 15–37)
BACTERIA # UR AUTO: NEGATIVE /HPF — SIGNIFICANT CHANGE UP
BASOPHILS # BLD AUTO: 0.05 K/UL — SIGNIFICANT CHANGE UP (ref 0–0.2)
BASOPHILS NFR BLD AUTO: 0.4 % — SIGNIFICANT CHANGE UP (ref 0–2)
BILIRUB SERPL-MCNC: 0.3 MG/DL — SIGNIFICANT CHANGE UP (ref 0.2–1.2)
BILIRUB UR-MCNC: NEGATIVE — SIGNIFICANT CHANGE UP
BLD GP AB SCN SERPL QL: SIGNIFICANT CHANGE UP
BUN SERPL-MCNC: 16 MG/DL — SIGNIFICANT CHANGE UP (ref 7–23)
CALCIUM SERPL-MCNC: 8.9 MG/DL — SIGNIFICANT CHANGE UP (ref 8.5–10.1)
CAST: 0 /LPF — SIGNIFICANT CHANGE UP (ref 0–4)
CHLORIDE SERPL-SCNC: 109 MMOL/L — HIGH (ref 96–108)
CO2 SERPL-SCNC: 25 MMOL/L — SIGNIFICANT CHANGE UP (ref 22–31)
COLOR SPEC: YELLOW — SIGNIFICANT CHANGE UP
CREAT SERPL-MCNC: 0.77 MG/DL — SIGNIFICANT CHANGE UP (ref 0.5–1.3)
DIFF PNL FLD: ABNORMAL
EGFR: 77 ML/MIN/1.73M2 — SIGNIFICANT CHANGE UP
EGFR: 77 ML/MIN/1.73M2 — SIGNIFICANT CHANGE UP
EOSINOPHIL # BLD AUTO: 0 K/UL — SIGNIFICANT CHANGE UP (ref 0–0.5)
EOSINOPHIL NFR BLD AUTO: 0 % — SIGNIFICANT CHANGE UP (ref 0–6)
GLUCOSE SERPL-MCNC: 155 MG/DL — HIGH (ref 70–99)
GLUCOSE UR QL: NEGATIVE MG/DL — SIGNIFICANT CHANGE UP
HCT VFR BLD CALC: 41.3 % — SIGNIFICANT CHANGE UP (ref 34.5–45)
HGB BLD-MCNC: 13.7 G/DL — SIGNIFICANT CHANGE UP (ref 11.5–15.5)
IMM GRANULOCYTES # BLD AUTO: 0.04 K/UL — SIGNIFICANT CHANGE UP (ref 0–0.07)
IMM GRANULOCYTES NFR BLD AUTO: 0.4 % — SIGNIFICANT CHANGE UP (ref 0–0.9)
INR BLD: 2.27 RATIO — HIGH (ref 0.85–1.16)
KETONES UR QL: ABNORMAL MG/DL
LEUKOCYTE ESTERASE UR-ACNC: ABNORMAL
LYMPHOCYTES # BLD AUTO: 2.11 K/UL — SIGNIFICANT CHANGE UP (ref 1–3.3)
LYMPHOCYTES NFR BLD AUTO: 18.6 % — SIGNIFICANT CHANGE UP (ref 13–44)
MAGNESIUM SERPL-MCNC: 1.9 MG/DL — SIGNIFICANT CHANGE UP (ref 1.6–2.6)
MCHC RBC-ENTMCNC: 32.1 PG — SIGNIFICANT CHANGE UP (ref 27–34)
MCHC RBC-ENTMCNC: 33.2 G/DL — SIGNIFICANT CHANGE UP (ref 32–36)
MCV RBC AUTO: 96.7 FL — SIGNIFICANT CHANGE UP (ref 80–100)
MONOCYTES # BLD AUTO: 0.28 K/UL — SIGNIFICANT CHANGE UP (ref 0–0.9)
MONOCYTES NFR BLD AUTO: 2.5 % — SIGNIFICANT CHANGE UP (ref 2–14)
NEUTROPHILS # BLD AUTO: 8.87 K/UL — HIGH (ref 1.8–7.4)
NEUTROPHILS NFR BLD AUTO: 78.1 % — HIGH (ref 43–77)
NITRITE UR-MCNC: NEGATIVE — SIGNIFICANT CHANGE UP
NRBC # BLD AUTO: 0 K/UL — SIGNIFICANT CHANGE UP (ref 0–0)
NRBC # FLD: 0 K/UL — SIGNIFICANT CHANGE UP (ref 0–0)
NRBC BLD AUTO-RTO: 0 /100 WBCS — SIGNIFICANT CHANGE UP (ref 0–0)
PH UR: 7 — SIGNIFICANT CHANGE UP (ref 5–8)
PLATELET # BLD AUTO: 387 K/UL — SIGNIFICANT CHANGE UP (ref 150–400)
PMV BLD: 10.7 FL — SIGNIFICANT CHANGE UP (ref 7–13)
POTASSIUM SERPL-MCNC: 4.2 MMOL/L — SIGNIFICANT CHANGE UP (ref 3.5–5.3)
POTASSIUM SERPL-SCNC: 4.2 MMOL/L — SIGNIFICANT CHANGE UP (ref 3.5–5.3)
PROT SERPL-MCNC: 7.2 GM/DL — SIGNIFICANT CHANGE UP (ref 6–8.3)
PROT UR-MCNC: 30 MG/DL
PROTHROM AB SERPL-ACNC: 26.6 SEC — HIGH (ref 9.9–13.4)
RBC # BLD: 4.27 M/UL — SIGNIFICANT CHANGE UP (ref 3.8–5.2)
RBC # FLD: 14 % — SIGNIFICANT CHANGE UP (ref 10.3–14.5)
RBC CASTS # UR COMP ASSIST: 4 /HPF — SIGNIFICANT CHANGE UP (ref 0–4)
SODIUM SERPL-SCNC: 138 MMOL/L — SIGNIFICANT CHANGE UP (ref 135–145)
SP GR SPEC: 1.01 — SIGNIFICANT CHANGE UP (ref 1–1.03)
SQUAMOUS # UR AUTO: 1 /HPF — SIGNIFICANT CHANGE UP (ref 0–5)
TROPONIN I, HIGH SENSITIVITY RESULT: 12.84 NG/L — SIGNIFICANT CHANGE UP
TROPONIN I, HIGH SENSITIVITY RESULT: 13.68 NG/L — SIGNIFICANT CHANGE UP
UROBILINOGEN FLD QL: 0.2 MG/DL — SIGNIFICANT CHANGE UP (ref 0.2–1)
WBC # BLD: 11.35 K/UL — HIGH (ref 3.8–10.5)
WBC # FLD AUTO: 11.35 K/UL — HIGH (ref 3.8–10.5)
WBC UR QL: 2 /HPF — SIGNIFICANT CHANGE UP (ref 0–5)

## 2025-07-08 PROCEDURE — 83036 HEMOGLOBIN GLYCOSYLATED A1C: CPT

## 2025-07-08 PROCEDURE — 94640 AIRWAY INHALATION TREATMENT: CPT

## 2025-07-08 PROCEDURE — 36415 COLL VENOUS BLD VENIPUNCTURE: CPT

## 2025-07-08 PROCEDURE — 80048 BASIC METABOLIC PNL TOTAL CA: CPT

## 2025-07-08 PROCEDURE — 97116 GAIT TRAINING THERAPY: CPT | Mod: GP

## 2025-07-08 PROCEDURE — 80061 LIPID PANEL: CPT

## 2025-07-08 PROCEDURE — 93880 EXTRACRANIAL BILAT STUDY: CPT | Mod: 26

## 2025-07-08 PROCEDURE — 93880 EXTRACRANIAL BILAT STUDY: CPT

## 2025-07-08 PROCEDURE — 97161 PT EVAL LOW COMPLEX 20 MIN: CPT | Mod: GP

## 2025-07-08 PROCEDURE — 70551 MRI BRAIN STEM W/O DYE: CPT

## 2025-07-08 PROCEDURE — 99285 EMERGENCY DEPT VISIT HI MDM: CPT

## 2025-07-08 PROCEDURE — 85610 PROTHROMBIN TIME: CPT

## 2025-07-08 PROCEDURE — 70450 CT HEAD/BRAIN W/O DYE: CPT | Mod: 26

## 2025-07-08 PROCEDURE — 71045 X-RAY EXAM CHEST 1 VIEW: CPT | Mod: 26

## 2025-07-08 PROCEDURE — 93306 TTE W/DOPPLER COMPLETE: CPT

## 2025-07-08 PROCEDURE — 85730 THROMBOPLASTIN TIME PARTIAL: CPT

## 2025-07-08 PROCEDURE — 99223 1ST HOSP IP/OBS HIGH 75: CPT

## 2025-07-08 RX ORDER — AMLODIPINE BESYLATE 10 MG/1
1 TABLET ORAL
Refills: 0 | DISCHARGE

## 2025-07-08 RX ORDER — ONDANSETRON HCL/PF 4 MG/2 ML
4 VIAL (ML) INJECTION EVERY 6 HOURS
Refills: 0 | Status: DISCONTINUED | OUTPATIENT
Start: 2025-07-08 | End: 2025-07-10

## 2025-07-08 RX ORDER — MECLIZINE HCL 12.5 MG
25 TABLET ORAL ONCE
Refills: 0 | Status: COMPLETED | OUTPATIENT
Start: 2025-07-08 | End: 2025-07-08

## 2025-07-08 RX ORDER — CLOPIDOGREL BISULFATE 75 MG/1
75 TABLET, FILM COATED ORAL AT BEDTIME
Refills: 0 | Status: DISCONTINUED | OUTPATIENT
Start: 2025-07-08 | End: 2025-07-10

## 2025-07-08 RX ORDER — AMLODIPINE BESYLATE 10 MG/1
5 TABLET ORAL AT BEDTIME
Refills: 0 | Status: DISCONTINUED | OUTPATIENT
Start: 2025-07-08 | End: 2025-07-10

## 2025-07-08 RX ORDER — LORAZEPAM 4 MG/ML
0.5 VIAL (ML) INJECTION ONCE
Refills: 0 | Status: DISCONTINUED | OUTPATIENT
Start: 2025-07-08 | End: 2025-07-09

## 2025-07-08 RX ORDER — BEMPEDOIC ACID 180 MG/1
0 TABLET, FILM COATED ORAL
Refills: 0 | DISCHARGE

## 2025-07-08 RX ORDER — ONDANSETRON HCL/PF 4 MG/2 ML
4 VIAL (ML) INJECTION ONCE
Refills: 0 | Status: COMPLETED | OUTPATIENT
Start: 2025-07-08 | End: 2025-07-08

## 2025-07-08 RX ORDER — CARBIDOPA/LEVODOPA 25MG-100MG
1 TABLET ORAL
Refills: 0 | DISCHARGE

## 2025-07-08 RX ORDER — DIAZEPAM 5 MG/1
2 TABLET ORAL THREE TIMES A DAY
Refills: 0 | Status: DISCONTINUED | OUTPATIENT
Start: 2025-07-08 | End: 2025-07-10

## 2025-07-08 RX ORDER — MECLIZINE HCL 12.5 MG
25 TABLET ORAL THREE TIMES A DAY
Refills: 0 | Status: DISCONTINUED | OUTPATIENT
Start: 2025-07-08 | End: 2025-07-10

## 2025-07-08 RX ORDER — CARBIDOPA/LEVODOPA 25MG-100MG
1 TABLET ORAL THREE TIMES A DAY
Refills: 0 | Status: DISCONTINUED | OUTPATIENT
Start: 2025-07-08 | End: 2025-07-10

## 2025-07-08 RX ORDER — METOPROLOL SUCCINATE 50 MG/1
1 TABLET, EXTENDED RELEASE ORAL
Refills: 0 | DISCHARGE

## 2025-07-08 RX ORDER — CLOPIDOGREL BISULFATE 75 MG/1
1 TABLET, FILM COATED ORAL
Refills: 0 | DISCHARGE

## 2025-07-08 RX ORDER — METOPROLOL SUCCINATE 50 MG/1
25 TABLET, EXTENDED RELEASE ORAL AT BEDTIME
Refills: 0 | Status: DISCONTINUED | OUTPATIENT
Start: 2025-07-08 | End: 2025-07-10

## 2025-07-08 RX ORDER — ACETAMINOPHEN 500 MG/5ML
650 LIQUID (ML) ORAL EVERY 6 HOURS
Refills: 0 | Status: DISCONTINUED | OUTPATIENT
Start: 2025-07-08 | End: 2025-07-10

## 2025-07-08 RX ADMIN — Medication 650 MILLIGRAM(S): at 21:19

## 2025-07-08 RX ADMIN — AMLODIPINE BESYLATE 5 MILLIGRAM(S): 10 TABLET ORAL at 21:12

## 2025-07-08 RX ADMIN — CLOPIDOGREL BISULFATE 75 MILLIGRAM(S): 75 TABLET, FILM COATED ORAL at 21:12

## 2025-07-08 RX ADMIN — Medication 4 MILLIGRAM(S): at 15:32

## 2025-07-08 RX ADMIN — Medication 1 TABLET(S): at 21:13

## 2025-07-08 RX ADMIN — Medication 4 MILLIGRAM(S): at 12:39

## 2025-07-08 RX ADMIN — Medication 500 MILLILITER(S): at 15:50

## 2025-07-08 RX ADMIN — Medication 25 MILLIGRAM(S): at 12:39

## 2025-07-08 RX ADMIN — METOPROLOL SUCCINATE 25 MILLIGRAM(S): 50 TABLET, EXTENDED RELEASE ORAL at 21:12

## 2025-07-08 RX ADMIN — Medication 500 MILLILITER(S): at 12:39

## 2025-07-08 RX ADMIN — Medication 5 MILLIGRAM(S): at 21:11

## 2025-07-08 RX ADMIN — Medication 25 MILLIGRAM(S): at 15:32

## 2025-07-08 NOTE — ED ADULT NURSE NOTE - OBJECTIVE STATEMENT
pt presenting with family for severe dizziness, reports not being able to walk straight to bathroom. Pt reports having several episodes yesterday, with less severity. Pt reports diarrhea yesterday

## 2025-07-08 NOTE — PATIENT PROFILE ADULT - DO YOU FEEL UNSAFE AT HOME, WORK, OR SCHOOL?
and 24/7 assist (pt is safe for home discharge with HPT and 24/7 assist, however if remains at [facility] will continue to receive PT)/home w/ home PT no

## 2025-07-08 NOTE — ED ADULT NURSE NOTE - SUICIDE SCREENING QUESTION 3
Care Due:                  Date            Visit Type   Department     Provider  --------------------------------------------------------------------------------                                EP -                              PRIMARY      [facility] FAMILY  Last Visit: 12-      CARE ([facility])   MEDICINE       Emily Nassar  Next Visit: None Scheduled  None         None Found                                                            Last  Test          Frequency    Reason                     Performed    Due Date  --------------------------------------------------------------------------------    Office Visit  15 months..  DARNELL EScitalopram,     12- 03-                             atorvastatin.............    CBC.........  12 months..  ELIQUIS..................  12- 12-    CMP.........  12 months..  ELIQUIS, atorvastatin....  12- 12-    Lipid Panel.  12 months..  atorvastatin.............  12- 12-    [facility] Embedded Care Due Messages. Reference number: [id number].   4/27/2025 12:16:06 AM CDT   Patient unable to complete

## 2025-07-08 NOTE — H&P ADULT - ASSESSMENT
84 year old female w APS, CAD s/p stents, hx DVT on coumadin, HLD, HTN, Parkinsonian tremor BIBEMS for dizziness and difficulty walking    #TIA  #Intractable nausea  #Vertigo  central/cerebellar vs peripheral  1. admit to telemetry  2. neuro q 4  3. VS q 8  4 fall precautions  5. zofran 4 mg prn  6. meclizine 25 mg TID prn dizziness  7. valium 2 mg po TID if above ineffective  8. carotid dopplers  9. TTE  10. Neuro consult  11. PT consult        #Headache   over sinuses  reports chronic sinus issues  1. warm compress  2. acetaminophen for now        #APS  #VTE  #Therapeutic INR  1. coumadin 5 mg po tonight  2. repeat PT/INR in AM        #CAD s/p stents  1. plavix  2. has not started nexletol        #HTN  takes meds at HS  1. amlodipine  2. BB w parameters      #Pkinsonian tremor  1. carbidopa-levadopa TID        #VTE  on AC        #80 minutes

## 2025-07-08 NOTE — H&P ADULT - HISTORY OF PRESENT ILLNESS
84 year old female w APS, CAD s/p stents, hx DVT on coumadin, HLD, HTN BIBEMS for dizziness and difficulty walking    +dizziness starting at 10am yesterday   +headache 4/10 and N/V  last night when she went to bed, throughout the night got up several times to have diarrhea, was brown in color, no black stools,   no blood, also started began having nausea with dry heaving, no vomiting.    Today is having difficulty ambulating due to dizziness feeling like the room is spinning around her, worsens when she moves her head suddenly  denies any ringing in the ears, no visual changes, no numbness or weakness to  extremities  unable to ambulate due to the dizziness as she keeps falling to her side, however has not had any head trauma or LOC         In ED /93   HR 65   RR 18   T 97.8   95% sat RA     CTH no acute changes, zofran, meclizine                PAST MEDICAL HX  Anticardiolipin antibody syndrome  Antiphospholipid antibody positive APS  Basal cell carcinoma (BCC) of left upper extremity  CAD coronary artery disease  DVT (deep venous thrombosis)  Hyperlipidemia   IBS (irritable bowel syndrome)  Mesenteric thrombosis  Peritonitis  Occlusive thrombus       PAST SURGICAL HX  H/O splenectomy   History of appendectomy.  Cardiac staged cardiac catheterization : 08- CRISTINA of distal left main and           CRISTINA of ostial, proximal and mid LAD on 8/7/2024. RCA disease being managed medically.  Ankle surgery for R trimalleolar fx  84 year old female w APS, CAD s/p stents, hx DVT on coumadin, HLD, HTN, Parkinsonian tremor BIBEMS for dizziness and difficulty walking    +dizziness starting at 10 yesterday   +headache 4/10 across her forehead  Last night when she went to bed, throughout the night got up several times +N +dry heaves accompanied by BM, brown in color, no black stools,   +dizziness feeling like the room is spinning around her, worsens when she moves her head suddenly  denies any ringing in ears, no visual changes, no numbness or weakness to  extremities  unable to ambulate due to the dizziness as she keeps falling to her side; denies trauma  No prior hx, no recent illness        In ED /93   HR 65   RR 18   T 97.8   95% sat RA     CTH no acute changes, zofran, meclizine,  cc          PAST MEDICAL HX  Anticardiolipin antibody syndrome  Antiphospholipid antibody positive APS  Basal cell carcinoma (BCC) of left upper extremity  CAD coronary artery disease  DVT (deep venous thrombosis)  HLD hyperlipidemia   IBS (irritable bowel syndrome)  Mesenteric thrombosis  Peritonitis  Occlusive thrombus  Parkinsonian tremor       PAST SURGICAL HX  H/O splenectomy   History of appendectomy.  Cardiac staged cardiac catheterization : 08- CRISTINA of distal left main and           CRISTINA of ostial, proximal and mid LAD on 8/7/2024. RCA disease being managed medically.  Ankle surgery for R trimalleolar fx       SOCIAL HX    2 daughters at bedside  nonsmoker  very active

## 2025-07-08 NOTE — ED PROVIDER NOTE - PHYSICAL EXAMINATION
Constitutional: NAD AAOx3. actively dry heaving into basin  Eyes: EOMI, pupils equal  Head: Normocephalic atraumatic  Mouth: no airway obstruction  Cardiac: regular rate   Resp: Lungs CTAB  GI: Abd s/nt/nd  Neuro: CN2-12 intact. fatiguable rightward horizontal nystagmus. unable to assess gait due to vertigo. no dysdiadokinesia, normal finger/nose bilaterally, strength 5/5 in upper and lower extremities bilaterally.   Skin: No rashes

## 2025-07-08 NOTE — ED ADULT NURSE NOTE - CHIEF COMPLAINT QUOTE
Pt c/o dizziness starting at 10am yesterday. Endorses headache and N/V. HX of 2 cardiac stents on Coumadin. BEFAST NEG. . Denies recent falls/trauma. STAT EKG to be completed.

## 2025-07-08 NOTE — ED PROVIDER NOTE - OBJECTIVE STATEMENT
History of prior DVTs, on Coumadin, presenting for dizziness and difficulty walking.  Patient was in her normal state of health last night when she went to bed, throughout the night got up several times to have diarrhea, was brown in color, no black stools, no blood, also started began having nausea with dry heaving, no vomiting.  Today is having difficulty ambulating due to dizziness feeling like the room is spinning around her, worsens when she moves her head suddenly, denies any ringing in the ears, no visual changes, no numbness or weakness to the extremities.  Patient has been unable to ambulate due to the dizziness as she keeps falling to her side, however has not had any head trauma or LOC associated with this.  She endorses a mild 4-10 headache, is on blood thinners.

## 2025-07-08 NOTE — ED PROVIDER NOTE - NSICDXPASTMEDICALHX_GEN_ALL_CORE_FT
PAST MEDICAL HISTORY:  Anticardiolipin antibody syndrome     Hyperlipidemia     Occlusive thrombus

## 2025-07-08 NOTE — ED PROVIDER NOTE - CLINICAL SUMMARY MEDICAL DECISION MAKING FREE TEXT BOX
In summary patient is an elderly female on Coumadin presenting for vertigo like symptoms as well as diarrhea.  On exam has reproducible horizontal rightward beating nystagmus worsened with head turn.  Otherwise unremarkable neuroexam aside for being unable to assess gait due to severe dizziness.  Will treat with Zofran and meclizine, obtain CT head to evaluate for bleed given her headache as well as being on Coumadin.  Symptoms improved with meclizine and Zofran however still feeling slightly dizzy, still with reproducible nystagmus, given her hypercoagulable disorder will obtain MRI brain to evaluate for posterior circulation stroke.  Patient will be signed out to oncoming provider pending MRI results, ambulation trial and reassessment. In summary patient is an elderly female on Coumadin presenting for vertigo like symptoms as well as diarrhea.  On exam has reproducible horizontal rightward beating nystagmus worsened with head turn.  Otherwise unremarkable neuroexam aside for being unable to assess gait due to severe dizziness.  Will treat with Zofran and meclizine, obtain CT head to evaluate for bleed given her headache as well as being on Coumadin.  Symptoms improved with meclizine and Zofran however still feeling slightly dizzy, still with reproducible nystagmus, given her hypercoagulable disorder will obtain MRI brain to evaluate for posterior circulation stroke.  Will admit to medicine for MRI, physical therapy, trial of ambulation

## 2025-07-08 NOTE — PHARMACOTHERAPY INTERVENTION NOTE - COMMENTS
Medication reconciliation completed.  Reviewed Medication list and confirmed med allergies with patient; confirmed with Dr. First Med[facility].

## 2025-07-08 NOTE — H&P ADULT - NSHPPHYSICALEXAM_GEN_ALL_CORE
Vital Signs Last 24 Hrs  T(C): 36.4 (08 Jul 2025 16:30), Max: 36.8 (08 Jul 2025 16:00)  T(F): 97.5 (08 Jul 2025 16:30), Max: 98.2 (08 Jul 2025 16:00)  HR: 85 (08 Jul 2025 16:30) (65 - 85)  BP: 150/74 (08 Jul 2025 16:30) (140/93 - 159/67)  BP(mean): 96 (08 Jul 2025 16:00) (93 - 96)  RR: 18 (08 Jul 2025 16:30) (17 - 18)  SpO2: 95% (08 Jul 2025 16:30) (95% - 95%)    Parameters below as of 08 Jul 2025 16:30  Patient On (Oxygen Delivery Method): room air

## 2025-07-09 ENCOUNTER — RESULT REVIEW (OUTPATIENT)
Age: 85
End: 2025-07-09

## 2025-07-09 LAB
A1C WITH ESTIMATED AVERAGE GLUCOSE RESULT: 6.1 % — HIGH (ref 4–5.6)
ANION GAP SERPL CALC-SCNC: 4 MMOL/L — LOW (ref 5–17)
BUN SERPL-MCNC: 13 MG/DL — SIGNIFICANT CHANGE UP (ref 7–23)
CALCIUM SERPL-MCNC: 9.4 MG/DL — SIGNIFICANT CHANGE UP (ref 8.5–10.1)
CHLORIDE SERPL-SCNC: 108 MMOL/L — SIGNIFICANT CHANGE UP (ref 96–108)
CHOLEST SERPL-MCNC: 289 MG/DL — HIGH
CO2 SERPL-SCNC: 26 MMOL/L — SIGNIFICANT CHANGE UP (ref 22–31)
CREAT SERPL-MCNC: 0.87 MG/DL — SIGNIFICANT CHANGE UP (ref 0.5–1.3)
EGFR: 66 ML/MIN/1.73M2 — SIGNIFICANT CHANGE UP
EGFR: 66 ML/MIN/1.73M2 — SIGNIFICANT CHANGE UP
ESTIMATED AVERAGE GLUCOSE: 128 MG/DL — HIGH (ref 68–114)
GLUCOSE SERPL-MCNC: 109 MG/DL — HIGH (ref 70–99)
HDLC SERPL-MCNC: 61 MG/DL — SIGNIFICANT CHANGE UP
INR BLD: 2.76 RATIO — HIGH (ref 0.85–1.16)
LDLC SERPL-MCNC: 213 MG/DL — HIGH
LIPID PNL WITH DIRECT LDL SERPL: 213 MG/DL — HIGH
NONHDLC SERPL-MCNC: 228 MG/DL — HIGH
POTASSIUM SERPL-MCNC: 3.6 MMOL/L — SIGNIFICANT CHANGE UP (ref 3.5–5.3)
POTASSIUM SERPL-SCNC: 3.6 MMOL/L — SIGNIFICANT CHANGE UP (ref 3.5–5.3)
PROTHROM AB SERPL-ACNC: 31.4 SEC — HIGH (ref 9.9–13.4)
SODIUM SERPL-SCNC: 138 MMOL/L — SIGNIFICANT CHANGE UP (ref 135–145)
TRIGL SERPL-MCNC: 91 MG/DL — SIGNIFICANT CHANGE UP

## 2025-07-09 PROCEDURE — 99232 SBSQ HOSP IP/OBS MODERATE 35: CPT

## 2025-07-09 PROCEDURE — 99223 1ST HOSP IP/OBS HIGH 75: CPT

## 2025-07-09 PROCEDURE — 93306 TTE W/DOPPLER COMPLETE: CPT | Mod: 26

## 2025-07-09 RX ORDER — FLUTICASONE PROPIONATE 50 UG/1
1 SPRAY, METERED NASAL
Refills: 0 | Status: DISCONTINUED | OUTPATIENT
Start: 2025-07-09 | End: 2025-07-10

## 2025-07-09 RX ORDER — ALPRAZOLAM 0.5 MG
0.25 TABLET, EXTENDED RELEASE 24 HR ORAL ONCE
Refills: 0 | Status: DISCONTINUED | OUTPATIENT
Start: 2025-07-09 | End: 2025-07-10

## 2025-07-09 RX ORDER — ATORVASTATIN CALCIUM 80 MG/1
40 TABLET, FILM COATED ORAL AT BEDTIME
Refills: 0 | Status: DISCONTINUED | OUTPATIENT
Start: 2025-07-09 | End: 2025-07-10

## 2025-07-09 RX ADMIN — Medication 1 TABLET(S): at 06:04

## 2025-07-09 RX ADMIN — Medication 1 TABLET(S): at 21:33

## 2025-07-09 RX ADMIN — METOPROLOL SUCCINATE 25 MILLIGRAM(S): 50 TABLET, EXTENDED RELEASE ORAL at 21:33

## 2025-07-09 RX ADMIN — AMLODIPINE BESYLATE 5 MILLIGRAM(S): 10 TABLET ORAL at 21:33

## 2025-07-09 RX ADMIN — CLOPIDOGREL BISULFATE 75 MILLIGRAM(S): 75 TABLET, FILM COATED ORAL at 21:33

## 2025-07-09 RX ADMIN — Medication 1 TABLET(S): at 12:36

## 2025-07-09 RX ADMIN — Medication 5 MILLIGRAM(S): at 22:28

## 2025-07-09 NOTE — PHYSICAL THERAPY INITIAL EVALUATION ADULT - MODALITIES TREATMENT COMMENTS
pt reports mild inc dizziness w/ transitional mvts. pt left supine in bed, CBIR, NAD, HM pt reports mild inc dizziness w/ some transitional mvts (not very provacative), more bothered by light- vertigo vs complex migraine/migraine? pt left supine in bed, CBIR, NAD, HM

## 2025-07-09 NOTE — CONSULT NOTE ADULT - ASSESSMENT
85 yo F with PMHx as above presents c/o dizziness and difficulty walking    -symptoms sounds classic for vertigo likely from her sinusitis. Seems viral vs allergies and not bacterial. Consider prednisone to help with inflammation in sinuses. C/W meclizine PRN. May need vestibular PT.  -Recommend hydrating well  -No concerns for cardiac cause at this time.

## 2025-07-09 NOTE — PHYSICAL THERAPY INITIAL EVALUATION ADULT - ADDITIONAL COMMENTS
no AD in house, uses 2 walking sticks if out/unfamiliar setting. house w/ FOS w/ 2 HRs, 2 JANINE w/o rail (has pole to hold) no AD in house, uses 2 walking sticks if out/unfamiliar setting. house w/ FOS w/ 2 HRs, 2 JANINE w/o rail (has pole to hold). has comfort ht toilet and has shower chair- not in use

## 2025-07-09 NOTE — PROGRESS NOTE ADULT - TIME BILLING
Time spent coordinating the patient's care. This includes reviewing documentation pertinent to this admission, results and imaging. Further tests, medications, and procedures have been ordered as indicated. Laboratory results and the plan of care were communicated to the patient and family.  Discussed care plan with nursing and will discuss plan and care with appropriate consultant(s). Supporting documentation was completed and added to the patient's chart.

## 2025-07-09 NOTE — PHYSICAL THERAPY INITIAL EVALUATION ADULT - GAIT DEVIATIONS NOTED, PT EVAL
very slow, cautious, UEs in guarded posture. performance of head turns resulted in stagger 1x/decreased magda

## 2025-07-09 NOTE — CONSULT NOTE ADULT - SUBJECTIVE AND OBJECTIVE BOX
CHIEF COMPLAINT:  Patient is a 84y old  Female who presents with a chief complaint of dizziness and difficulty walking, worried she was having a CVA    HPI:  84 year old female w APS, CAD s/p stents, hx DVT on coumadin, HLD, HTN, Parkinsonian tremor BIBEMS for dizziness and difficulty walking    Patient has been starting to have sinus pressures/drip. She then developed HA followed by dizziness/room spinning. She went to bed but got up severe times in the night with N and dry heaves. Symptoms would be worse if she moves her head side to side.    In ED /93   HR 65   RR 18   T 97.8   95% sat RA     CTH no acute changes, zofran, meclizine,  cc    25: She is feeling a little bit better but still having episodes if she turns her head and the glare from the sun was bothering her through the window. She denies any fevers, chills, CP, palp, SOB, syncope, orthopnea, PND, focal weakness.    PAST MEDICAL HX  Anticardiolipin antibody syndrome  Antiphospholipid antibody positive APS  Basal cell carcinoma (BCC) of left upper extremity  CAD coronary artery disease  DVT (deep venous thrombosis)  HLD hyperlipidemia   IBS (irritable bowel syndrome)  Mesenteric thrombosis  Peritonitis  Occlusive thrombus  Parkinsonian tremor       PAST SURGICAL HX  H/O splenectomy   History of appendectomy.  Cardiac staged cardiac catheterization : 2024 CRISTINA of distal left main and           CRISTINA of ostial, proximal and mid LAD on 2024. RCA disease being managed medically.  Ankle surgery for R trimalleolar fx       SOCIAL HX    2 daughters at bedside  nonsmoker  very active (2025 17:49)    ALLERGIES:  Allergies  sulfa drugs (Unknown)  Lovenox (Unknown)  Compazine (Unknown)  latex (Unknown)    REVIEW OF SYSTEMS:  10 system ROS was obtained, all pertinent positives and negatives are in HPI otherwise they were negative.    Vital Signs Last 24 Hrs  T(C): 36.8 (2025 06:34), Max: 36.8 (2025 16:00)  T(F): 98.2 (2025 06:34), Max: 98.3 (2025 21:22)  HR: 61 (2025 06:34) (60 - 85)  BP: 128/69 (2025 06:34) (111/45 - 159/67)  BP(mean): 82 (2025 21:22) (82 - 96)  RR: 18 (2025 06:34) (17 - 18)  SpO2: 98% (2025 06:34) (93% - 98%)    Parameters below as of 2025 06:34  Patient On (Oxygen Delivery Method): room air    CAPILLARY BLOOD GLUCOSE  POCT Blood Glucose.: 163 mg/dL (2025 12:09)      PHYSICAL EXAM:   Constitutional: awake and alert in NAD  HEENT: Normal Hearing, MMM  Neck: Soft and supple, No LAD, No JVD, no carotid bruit  Respiratory: Breath sounds are clear bilaterally, No wheezing, rales or rhonchi, good air movement  Cardiovascular: RRR, soft systolic murmur at LSB and apex  Gastrointestinal: Bowel Sounds present, soft, nontender, nondistended, no guarding, no rebound  Extremities: Warm and well perfused, no peripheral edema  Neurological: A/O x 3, no focal deficits appreciated  Skin: No rashes appreciated    MEDICATIONS:  MEDICATIONS  (STANDING):  amLODIPine   Tablet 5 milliGRAM(s) Oral at bedtime  carbidopa/levodopa  25/100 1 Tablet(s) Oral three times a day  clopidogrel Tablet 75 milliGRAM(s) Oral at bedtime  metoprolol succinate ER 25 milliGRAM(s) Oral at bedtime  warfarin 5 milliGRAM(s) Oral once      LABS: All Labs Reviewed:                        13.7   11.35 )-----------( 387      ( 2025 12:19 )             41.3     07-08    138  |  109[H]  |  16  ----------------------------<  155[H]  4.2   |  25  |  0.77    Ca    8.9      2025 13:54  Mg     1.9     07-08    TPro  7.2  /  Alb  3.3  /  TBili  0.3  /  DBili  x   /  AST  17  /  ALT  24  /  AlkPhos  65  07-08    PT/INR - ( 2025 12:19 )   PT: 26.6 sec;   INR: 2.27 ratio    PTT - ( 2025 12:19 )  PTT:32.3 sec     RADIOLOGY:    Reviewed in EMR    EK/8/25, my interpretation: Sinus with APCs at 83bpm, normal EKG    TELEMETRY:    Reviewed. No AFib just sinus with APCs and an episode of SVT at 4:30pm yesterday    ECHO:

## 2025-07-09 NOTE — PHYSICAL THERAPY INITIAL EVALUATION ADULT - PERTINENT HX OF CURRENT PROBLEM, REHAB EVAL
84 year old female w APS, CAD s/p stents, hx DVT on coumadin, HLD, HTN, Parkinsonian tremor BIBEMS for dizziness and difficulty walking  Patient has been starting to have sinus pressures/drip. She then developed HA followed by dizziness/room spinning. She went to bed but got up severe times in the night with N and dry heaves. Symptoms would be worse if she moves her head side to side.  In ED /93   HR 65   RR 18   T 97.8   95% sat RA     CTH no acute changes, zofran, meclizine,  cc. MRI pending. Pt also reports sun, bright light bothersome

## 2025-07-09 NOTE — CONSULT NOTE ADULT - NS ATTEND AMEND GEN_ALL_CORE FT
84 year old woman PMH of anticardiolipin antibody syndrome, HA hx, CAD s/p stents, DVT on coumadin, HLD, HTN, Parkinsonian tremor (follows up with [employer] [facility]), ADM c/o postural vertigo.  CT head neg for acute findings. Non focal exam. likely vertiginous migraine. Agree with above.

## 2025-07-09 NOTE — PHYSICAL THERAPY INITIAL EVALUATION ADULT - GENERAL OBSERVATIONS, REHAB EVAL
pt rec'd supine in bed on 3N, HM, feeling better. doesn't believe she rec'd meclizine or at least not today. no tremor at this time, reports can be worse in particular if nervous.

## 2025-07-09 NOTE — CONSULT NOTE ADULT - ASSESSMENT
84 year old female PMH of anticardiolipin antibody syndrome, CAD s/p stents, DVT on coumadin, HLD, HTN, Parkinsonian tremor (follows up with [facility]), BIBEMS 7/8 for dizziness and difficulty walking.  Patient states she had dizziness when she woke up yesterday morning/vertiginous worse with moving head suddenly, frontal HA, N/V started previous night, unable to ambulate and keeps falling to her side 2/2 dizziness.  Denies dysarthria, diplopia, facial droop, focal weakness/numbness, fever, tinnitus.  CT head neg for acute findings.  In the ED moody had nystagmus and sx's improved with meclizine, zofran.  Pt. was admitted to r/o VBI given hypercoaguable disorder.  Pt. is feeling better, mild dizziness, but able to ambulate w/o assistance.  Carotid artery duplex and MRI brain are pending.  Exam is non-focal.      #likely peripheral vertigo, r/o VBI      Recommendations:  -Monitor on tele  -Neurochecks/VS q 4  -continue tx for vertigo PRN with meclizine, reglan, IVF   -F/U MRI brain and carotid doppler  -TTE 2D  -continue coumadin, plavix(home meds)  -Add statin 40mg QD for now  -Check A1c, LDL  -PT eval  -If MRI neg for stroke will benefit from outpatient vestibular therapy    D/W Dr. Castle, patient 84 year old female PMH of anticardiolipin antibody syndrome, HA hx, CAD s/p stents, DVT on coumadin, HLD, HTN, Parkinsonian tremor (follows up with [facility]), BIBEMS 7/8 for dizziness and difficulty walking.  Patient states she had dizziness when she woke up yesterday morning/vertiginous worse with moving head suddenly, frontal HA, photophobia, N/V started previous night, unable to ambulate and keeps falling to her side 2/2 dizziness.  Denies dysarthria, diplopia, facial droop, focal weakness/numbness, fever, tinnitus.  CT head neg for acute findings.  In the ED paitent had nystagmus and sx's improved with meclizine, zofran.  Pt. was admitted to r/o VBI given hypercoaguable disorder.  Pt. is feeling better, mild dizziness and HA, but able to ambulate w/o assistance.  Carotid artery duplex and MRI brain are pending.  Exam is non-focal.      #likely vertiginous migraine, r/o VBI      Recommendations:  -Monitor on tele  -Neurochecks/VS q 4  -continue tx for vertigo PRN with meclizine, reglan, IVF   -Tylenol for HA  -F/U MRI brain and carotid doppler  -TTE 2D  -continue coumadin, plavix(home meds)  -Add statin 40mg QD for now  -Check A1c, LDL  -PT eval  -If MRI neg for stroke will benefit from outpatient vestibular therapy    D/W Dr. Castle, patient

## 2025-07-09 NOTE — PROGRESS NOTE ADULT - ASSESSMENT
84 year old female w APS, CAD s/p stents, hx DVT on coumadin, HLD, HTN, Parkinsonian tremor BIBEMS for dizziness and difficulty walking    #r/o TIA  #Intractable nausea  #Vertigo  central/cerebellar vs peripheral  no events on tele, sx improving at this time  -neuro q 4, nneuro recs appreciated  -fall precautions  -zofran 4 mg prn  -meclizine 25 mg TID prn dizziness  -valium 2 mg po TID if above ineffective  -carotid dopplers  -TTE  -cards and neuro recs appreciated     #Headache   over sinuses  reports chronic sinus issues  - warm compress  - acetaminophen for now  - systemic steroids kiki recommended for sinusitis but will add intranasal     #APS  #VTE  #Therapeutic INR  -coumadin 5 mg po tonight  -daily pt/inr    #CAD s/p stents  - c/w home plavix    #HTN  takes meds at HS  - amlodipine  - BB w parameters    #Parkinsons tremor  - carbidopa-levadopa TID    #VTE  on AC 84 year old female w APS, CAD s/p stents, hx DVT on coumadin, HLD, HTN, Parkinsonian tremor BIBEMS for dizziness and difficulty walking    #r/o TIA  #Intractable nausea  #Vertigo  central/cerebellar vs peripheral  no events on tele, sx improving at this time  -neuro q 4, nneuro recs appreciated  -fall precautions  -zofran 4 mg prn  -meclizine 25 mg TID prn dizziness  -valium 2 mg po TID if above ineffective  -carotid dopplers  -TTE  -cards and neuro recs appreciated   -LDL elevated -- adding atorvastatin 40 qd    #Headache   over sinuses  reports chronic sinus issues  - warm compress  - acetaminophen for now  - systemic steroids kiki recommended for sinusitis but will add intranasal     #APS  #VTE  #Therapeutic INR  -coumadin 5 mg po tonight  -daily pt/inr    #CAD s/p stents  - c/w home plavix    #HTN  takes meds at HS  - amlodipine  - BB w parameters    #Parkinsons tremor  - carbidopa-levadopa TID    #VTE  on AC

## 2025-07-10 ENCOUNTER — TRANSCRIPTION ENCOUNTER (OUTPATIENT)
Age: 85
End: 2025-07-10

## 2025-07-10 VITALS
HEART RATE: 52 BPM | DIASTOLIC BLOOD PRESSURE: 55 MMHG | OXYGEN SATURATION: 92 % | SYSTOLIC BLOOD PRESSURE: 91 MMHG | TEMPERATURE: 98 F | RESPIRATION RATE: 17 BRPM

## 2025-07-10 LAB
APTT BLD: 36.8 SEC — SIGNIFICANT CHANGE UP (ref 26.1–36.8)
INR BLD: 2.57 RATIO — HIGH (ref 0.85–1.16)
PROTHROM AB SERPL-ACNC: 30.1 SEC — HIGH (ref 9.9–13.4)

## 2025-07-10 PROCEDURE — 99239 HOSP IP/OBS DSCHRG MGMT >30: CPT

## 2025-07-10 PROCEDURE — 70551 MRI BRAIN STEM W/O DYE: CPT | Mod: 26

## 2025-07-10 PROCEDURE — 99232 SBSQ HOSP IP/OBS MODERATE 35: CPT

## 2025-07-10 RX ORDER — ATORVASTATIN CALCIUM 80 MG/1
1 TABLET, FILM COATED ORAL
Qty: 30 | Refills: 0
Start: 2025-07-10 | End: 2025-08-08

## 2025-07-10 RX ORDER — MECLIZINE HCL 12.5 MG
1 TABLET ORAL
Qty: 21 | Refills: 0
Start: 2025-07-10 | End: 2025-07-16

## 2025-07-10 RX ORDER — FLUTICASONE PROPIONATE 50 UG/1
1 SPRAY, METERED NASAL
Qty: 1 | Refills: 0
Start: 2025-07-10 | End: 2025-08-08

## 2025-07-10 RX ADMIN — Medication 1 TABLET(S): at 06:20

## 2025-07-10 RX ADMIN — Medication 0.25 MILLIGRAM(S): at 10:14

## 2025-07-10 RX ADMIN — FLUTICASONE PROPIONATE 1 SPRAY(S): 50 SPRAY, METERED NASAL at 09:48

## 2025-07-10 RX ADMIN — Medication 1 TABLET(S): at 13:37

## 2025-07-10 NOTE — DISCHARGE NOTE PROVIDER - NSDCCPCAREPLAN_GEN_ALL_CORE_FT
PRINCIPAL DISCHARGE DIAGNOSIS  Diagnosis: Vertigo  Assessment and Plan of Treatment: You prsesnted with dizziness and a headache with some nausea  Your symptoms mproved and were likely due to your sinusitis which improved while here  Your mri showed no acute findings  Please folllow up with you primary care doctor  Your lipid panel showed elevation in your cholesterol so we started an atorvastatin tablet for you and follow up with your primary for repeat values

## 2025-07-10 NOTE — DISCHARGE NOTE PROVIDER - NSDCMRMEDTOKEN_GEN_ALL_CORE_FT
Allegra 180 mg oral tablet: 1 tab(s) orally once a day as needed for  allergy symptoms  amLODIPine 5 mg oral tablet: 1 tab(s) orally once a day (at bedtime)  carbidopa-levodopa 25 mg-100 mg oral tablet: 1 tab(s) orally 3 times a day  clopidogrel 75 mg oral tablet: 1 tab(s) orally once a day (at bedtime)  metoprolol succinate 25 mg oral tablet, extended release: 1 tab(s) orally once a day (at bedtime)  Nexletol 180 mg oral tablet: new med, NOT STARTED YET (1 tab once daily)  nitrofurantoin macrocrystals-monohydrate 100 mg oral capsule: 1 cap(s) orally 2 times a day for 7 days ***COMPLETE***  warfarin 5 mg oral tablet: 1 tab(s) orally once a day (at bedtime)   amLODIPine 5 mg oral tablet: 1 tab(s) orally once a day (at bedtime)  atorvastatin 40 mg oral tablet: 1 tab(s) orally once a day (at bedtime)  carbidopa-levodopa 25 mg-100 mg oral tablet: 1 tab(s) orally 3 times a day  clopidogrel 75 mg oral tablet: 1 tab(s) orally once a day (at bedtime)  fluticasone 50 mcg/inh nasal spray: 1 spray(s) nasal 2 times a day  meclizine 25 mg oral tablet: 1 tab(s) orally 3 times a day as needed for Dizziness  metoprolol succinate 25 mg oral tablet, extended release: 1 tab(s) orally once a day (at bedtime)  Nexletol 180 mg oral tablet: new med, NOT STARTED YET (1 tab once daily)  warfarin 5 mg oral tablet: 1 tab(s) orally once a day (at bedtime)

## 2025-07-10 NOTE — DISCHARGE NOTE PROVIDER - HOSPITAL COURSE
#Discharge: do not delete  84 year old female w APS, CAD s/p stents, hx DVT on coumadin, HLD, HTN, Parkinsonian tremor BIBEMS for dizziness and difficulty walking. Work up including mRI brain was negative - stable for discharge symptoms likely due to acute on chronic sinusitis which resolved.   Problem List/Main Diagnoses (system-based):   #r/o TIA  #Intractable nausea  #Vertigo  central/cerebellar vs peripheral  no events on tele, sx improving at this time  -neuro q 4, nneuro recs appreciated  -fall precautions  -zofran 4 mg prn  -meclizine 25 mg TID prn dizziness  -carotid dopplers with no stenosis   -TTE appreciated Left ventricular cavity is normal in size. Left ventricular systolic function is normal with an ejection fraction of 61 % by Jules's method of disks.  -cards and neuro recs appreciated   -LDL elevated -- added atorvastatin 40 qd    #Headache   over sinuses  reports chronic sinus issues  - warm compress  - acetaminophen for now  - systemic steroids kiki recommended for sinusitis but will add intranasal with improvement appreciated    #APS  #VTE  #Therapeutic INR  -coumadin po regimen     #CAD s/p stents  - c/w home plavix    #HTN  takes meds at HS  - amlodipine  - BB w parameters    #Parkinsons tremor  - carbidopa-levadopa TID    Inpatient treatment course: as above  New medications: flonase #Discharge: do not delete  84 year old female w APS, CAD s/p stents, hx DVT on coumadin, HLD, HTN, Parkinsonian tremor BIBEMS for dizziness and difficulty walking. Work up including mRI brain was negative - stable for discharge symptoms likely due to acute on chronic sinusitis which resolved.   Problem List/Main Diagnoses (system-based):   #r/o TIA  #Intractable nausea RESOLVED  #Vertigo RESOLVED  central/cerebellar vs peripheral  no events on tele, sx improving at this time  likely due to sinusitis   -neuro q 4, nneuro recs appreciated  -fall precautions  -zofran 4 mg prn  -meclizine 25 mg TID prn dizziness  -carotid dopplers with no stenosis   -TTE appreciated Left ventricular cavity is normal in size. Left ventricular systolic function is normal with an ejection fraction of 61 % by Jules's method of disks.  -cards and neuro recs appreciated   -LDL elevated -- added atorvastatin 40 qd    #Headache   over sinuses  reports chronic sinus issues  - warm compress  - acetaminophen for now  - systemic steroids not recommended for sinusitis but will add intranasal with improvement appreciated    #APS  #VTE  #Therapeutic INR  -coumadin po regimen     #CAD s/p stents  - c/w home plavix    #HTN  takes meds at HS  - amlodipine  - BB w parameters    #Parkinsons tremor  - carbidopa-levadopa TID    Inpatient treatment course: as above  New medications: flonase

## 2025-07-10 NOTE — PROGRESS NOTE ADULT - ASSESSMENT
84 year old female PMH of anticardiolipin antibody syndrome, HA hx, CAD s/p stents, DVT on coumadin, HLD, HTN, Parkinsonian tremor (follows up with [facility]), BIBEMS 7/8 for dizziness and difficulty walking.  Patient states she had dizziness when she woke up yesterday morning/vertiginous worse with moving head suddenly, frontal HA, photophobia, N/V started previous night, unable to ambulate and keeps falling to her side 2/2 dizziness.  Denies dysarthria, diplopia, facial droop, focal weakness/numbness, fever, tinnitus.  CT head neg for acute findings.  In the ED liztent had nystagmus and sx's improved with meclizine, zofran.  Pt. was admitted to r/o VBI given hypercoaguable disorder.  Pt. is feeling better, mild dizziness and HA, but able to ambulate w/o assistance.  Carotid artery duplex and MRI brain are pending.  Exam is non-focal.      #likely vertiginous migraine-resolved  MRI brain neg for acute stroke      Recommendations:  -continue tx for vertigo PRN with meclizine outpatient  -PT eval  -May benefit from outpatient vestibular therapy  -F/U with [facility] neuro outpatient  -will sign off    D/W Dr. Castle, patient

## 2025-07-10 NOTE — DISCHARGE NOTE NURSING/CASE MANAGEMENT/SOCIAL WORK - NSDCPEFALRISK_GEN_ALL_CORE
For information on Fall & Injury Prevention, visit: [email or web address] OR  [email or web address] OR  https://www.cdc.gov/steadi/patient.html

## 2025-07-10 NOTE — DISCHARGE NOTE PROVIDER - CARE PROVIDER_API CALL
Reba Dominguez  Internal Medicine  [address], [address]  [place] [address]  Phone: [phone]  Fax: [phone]  Follow Up Time:

## 2025-07-10 NOTE — PROGRESS NOTE ADULT - NS ATTEND AMEND GEN_ALL_CORE FT
84 year woman PMH of anticardiolipin antibody syndrome, HA hx, CAD s/p stents, DVT on coumadin, HLD, HTN, Parkinsonian tremor (follows up with [employer]), BIBEMS 7/8 for dizziness.  CT head neg for acute findings. MRI brain neg for acute stroke. Likely BPV. Agree with above.

## 2025-07-10 NOTE — DISCHARGE NOTE NURSING/CASE MANAGEMENT/SOCIAL WORK - PATIENT PORTAL LINK FT
You can access the FollowMyHealth Patient Portal offered by [facility] by registering at the following website: [email or web address]. By joining [facility]’s FollowMyHealth portal, you will also be able to view your health information using other applications (apps) compatible with our system.

## 2025-07-10 NOTE — PROGRESS NOTE ADULT - SUBJECTIVE AND OBJECTIVE BOX
No acute events overnight, no new complaints.  MRI brain neg for stroke, US carotids no significant stenosis, TTE no sig findings.  Sx's resolved.  Able to ambulate w/o difficulty.    ROS: As stated above, otherwise neg    MEDICATIONS  (STANDING):  amLODIPine   Tablet 5 milliGRAM(s) Oral at bedtime  atorvastatin 40 milliGRAM(s) Oral at bedtime  carbidopa/levodopa  25/100 1 Tablet(s) Oral three times a day  clopidogrel Tablet 75 milliGRAM(s) Oral at bedtime  fluticasone propionate 50 MICROgram(s)/spray Nasal Spray 1 Spray(s) Both Nostrils two times a day  metoprolol succinate ER 25 milliGRAM(s) Oral at bedtime  warfarin 5 milliGRAM(s) Oral once      Vital Signs Last 24 Hrs  T(C): 36.7 (10 Jul 2025 12:52), Max: 36.9 (09 Jul 2025 16:30)  T(F): 98 (10 Jul 2025 12:52), Max: 98.5 (09 Jul 2025 16:30)  HR: 52 (10 Jul 2025 12:52) (48 - 65)  BP: 91/55 (10 Jul 2025 12:52) (91/55 - 139/55)  BP(mean): 76 (09 Jul 2025 16:30) (76 - 76)  RR: 17 (10 Jul 2025 12:52) (17 - 18)  SpO2: 92% (10 Jul 2025 12:52) (92% - 99%)    Parameters below as of 10 Jul 2025 12:52  Patient On (Oxygen Delivery Method): room air      Neurological exam:  HF: A x O x 3. Appropriately interactive, normal affect. Speech fluent, No Aphasia or paraphasic errors. Naming /repetition intact   CN: MISA, EOMI, VFF, facial sensation normal, no NLFD, tongue midline, Palate moves equally  Motor: No pronator drift, Strength 5/5 in all 4 ext, normal bulk and tone, +resting tremor, no rigidity or bradykinesia  Sens: Intact to light touch    Reflexes: Symmetric and normal, downgoing toes b/l  Coord:  No FNFA, dysmetria, MAGALIS intact   Gait/Balance: Normal    NIHSS: 0    07-09    138  |  108  |  13  ----------------------------<  109[H]  3.6   |  26  |  0.87    Ca    9.4      09 Jul 2025 07:43        07-09 Chol 289[H] LDL -- HDL 61 Trig 91    Radiology report:  < from: US Duplex Carotid Arteries Complete, Bilateral (07.08.25 @ 20:21) >  IMPRESSION: No significant hemodynamic stenosis of either internal   carotid artery.    < from: MR Head No Cont (07.10.25 @ 10:57) >  IMPRESSION: No acute infarct, hemorrhage, or mass effect.    < end of copied text >    < from: CT Head No Cont (07.08.25 @ 12:51) >  IMPRESSION:    No acute intracranial findings.  < from: TTE W or WO Ultrasound Enhancing Agent (07.09.25 @ 11:15) >  CONCLUSIONS:      1. Left ventricular cavity is normal in size. Left ventricular systolic function is normal with an ejection fraction of 61 % by Jules's method of disks.   2. Normal right ventricular cavity size, with normal wall thickness, and normal right ventricular systolic function.   3. Trace mitral regurgitation.   4. Mild tricuspid regurgitation.   5. Trace pulmonic regurgitation.      
CC: Patient is a 84y old  Female who presents with a chief complaint of Vertigo (09 Jul 2025 11:48)      INTERVAL EVENTS: tte performed    SUBJECTIVE / INTERVAL HPI: Patient seen and examined at bedside. Patient says she feels good at this time has no acute complaints and the dizziness is slightly improved    ROS: negative unless otherwise stated above.    VITAL SIGNS:  Vital Signs Last 24 Hrs  T(C): 37.3 (09 Jul 2025 12:08), Max: 37.5 (09 Jul 2025 09:18)  T(F): 99.1 (09 Jul 2025 12:08), Max: 99.5 (09 Jul 2025 09:18)  HR: 66 (09 Jul 2025 12:08) (60 - 85)  BP: 127/61 (09 Jul 2025 12:08) (111/45 - 159/67)  BP(mean): 82 (08 Jul 2025 21:22) (82 - 96)  RR: 18 (09 Jul 2025 12:08) (17 - 18)  SpO2: 95% (09 Jul 2025 12:08) (93% - 98%)    Parameters below as of 09 Jul 2025 12:08  Patient On (Oxygen Delivery Method): room air            PHYSICAL EXAM:    General: NAD  HEENT: MMM  Neck: supple  Cardiovascular: +S1/S2; RRR  Respiratory: CTA B/L; no W/R/R  Gastrointestinal: soft, NT/ND  Extremities: WWP; no edema, clubbing or cyanosis  Vascular: 2+ radial, DP/PT pulses B/L  Neurological: AAOx3; no focal deficits    MEDICATIONS:  MEDICATIONS  (STANDING):  ALPRAZolam 0.25 milliGRAM(s) Oral once  amLODIPine   Tablet 5 milliGRAM(s) Oral at bedtime  carbidopa/levodopa  25/100 1 Tablet(s) Oral three times a day  clopidogrel Tablet 75 milliGRAM(s) Oral at bedtime  fluticasone propionate 50 MICROgram(s)/spray Nasal Spray 1 Spray(s) Both Nostrils two times a day  metoprolol succinate ER 25 milliGRAM(s) Oral at bedtime  warfarin 5 milliGRAM(s) Oral once    MEDICATIONS  (PRN):  acetaminophen     Tablet .. 650 milliGRAM(s) Oral every 6 hours PRN Temp greater or equal to 38C (100.4F), Mild Pain (1 - 3)  diazepam    Tablet 2 milliGRAM(s) Oral three times a day PRN if no relief w meclizine  meclizine 25 milliGRAM(s) Oral three times a day PRN Dizziness  ondansetron Injectable 4 milliGRAM(s) IV Push every 6 hours PRN Nausea and/or Vomiting      ALLERGIES:  Allergies    sulfa drugs (Unknown)  Lovenox (Unknown)  Compazine (Unknown)  latex (Unknown)    Intolerances        LABS:                        13.7   11.35 )-----------( 387      ( 08 Jul 2025 12:19 )             41.3     07-09    138  |  108  |  13  ----------------------------<  109[H]  3.6   |  26  |  0.87    Ca    9.4      09 Jul 2025 07:43  Mg     1.9     07-08    TPro  7.2  /  Alb  3.3  /  TBili  0.3  /  DBili  x   /  AST  17  /  ALT  24  /  AlkPhos  65  07-08    PT/INR - ( 09 Jul 2025 07:43 )   PT: 31.4 sec;   INR: 2.76 ratio         PTT - ( 08 Jul 2025 12:19 )  PTT:32.3 sec  Urinalysis Basic - ( 09 Jul 2025 07:43 )    Color: x / Appearance: x / SG: x / pH: x  Gluc: 109 mg/dL / Ketone: x  / Bili: x / Urobili: x   Blood: x / Protein: x / Nitrite: x   Leuk Esterase: x / RBC: x / WBC x   Sq Epi: x / Non Sq Epi: x / Bacteria: x      CAPILLARY BLOOD GLUCOSE      POCT Blood Glucose.: 163 mg/dL (08 Jul 2025 12:09)      RADIOLOGY & ADDITIONAL TESTS: Reviewed.

## 2025-07-10 NOTE — DISCHARGE NOTE NURSING/CASE MANAGEMENT/SOCIAL WORK - FINANCIAL ASSISTANCE
[facility] provides services at a reduced cost to those who are determined to be eligible through [facility]’s financial assistance program. Information regarding [facility]’s financial assistance program can be found by going to [email or web address] or by calling [phone].

## 2025-07-17 LAB
INR PPP: 2.9 RATIO
QUALITY CONTROL: YES

## 2025-07-19 DIAGNOSIS — Z88.2 ALLERGY STATUS TO SULFONAMIDES: ICD-10-CM

## 2025-07-19 DIAGNOSIS — Z79.01 LONG TERM (CURRENT) USE OF ANTICOAGULANTS: ICD-10-CM

## 2025-07-19 DIAGNOSIS — K58.9 IRRITABLE BOWEL SYNDROME, UNSPECIFIED: ICD-10-CM

## 2025-07-19 DIAGNOSIS — Z79.02 LONG TERM (CURRENT) USE OF ANTITHROMBOTICS/ANTIPLATELETS: ICD-10-CM

## 2025-07-19 DIAGNOSIS — Z86.718 PERSONAL HISTORY OF OTHER VENOUS THROMBOSIS AND EMBOLISM: ICD-10-CM

## 2025-07-19 DIAGNOSIS — Z88.8 ALLERGY STATUS TO OTHER DRUGS, MEDICAMENTS AND BIOLOGICAL SUBSTANCES: ICD-10-CM

## 2025-07-19 DIAGNOSIS — G20.C PARKINSONISM, UNSPECIFIED: ICD-10-CM

## 2025-07-19 DIAGNOSIS — Z91.040 LATEX ALLERGY STATUS: ICD-10-CM

## 2025-07-19 DIAGNOSIS — Z90.49 ACQUIRED ABSENCE OF OTHER SPECIFIED PARTS OF DIGESTIVE TRACT: ICD-10-CM

## 2025-07-19 DIAGNOSIS — Z85.828 PERSONAL HISTORY OF OTHER MALIGNANT NEOPLASM OF SKIN: ICD-10-CM

## 2025-07-19 DIAGNOSIS — D68.61 ANTIPHOSPHOLIPID SYNDROME: ICD-10-CM

## 2025-07-19 DIAGNOSIS — J01.90 ACUTE SINUSITIS, UNSPECIFIED: ICD-10-CM

## 2025-07-19 DIAGNOSIS — E78.5 HYPERLIPIDEMIA, UNSPECIFIED: ICD-10-CM

## 2025-07-19 DIAGNOSIS — I25.10 ATHEROSCLEROTIC HEART DISEASE OF NATIVE CORONARY ARTERY WITHOUT ANGINA PECTORIS: ICD-10-CM

## 2025-07-19 DIAGNOSIS — I10 ESSENTIAL (PRIMARY) HYPERTENSION: ICD-10-CM

## 2025-07-19 DIAGNOSIS — Z90.81 ACQUIRED ABSENCE OF SPLEEN: ICD-10-CM

## 2025-07-19 DIAGNOSIS — R26.2 DIFFICULTY IN WALKING, NOT ELSEWHERE CLASSIFIED: ICD-10-CM

## 2025-07-19 DIAGNOSIS — Z95.5 PRESENCE OF CORONARY ANGIOPLASTY IMPLANT AND GRAFT: ICD-10-CM

## 2025-07-26 LAB
INR PPP: 3.2 RATIO
QUALITY CONTROL: YES

## 2025-07-31 LAB
INR PPP: 2.9 RATIO
QUALITY CONTROL: YES

## 2025-08-07 LAB
INR PPP: 3.6 RATIO
QUALITY CONTROL: YES

## 2025-08-14 LAB
INR PPP: 3.3 RATIO
QUALITY CONTROL: YES

## 2025-08-19 ENCOUNTER — APPOINTMENT (OUTPATIENT)
Dept: VASCULAR SURGERY | Facility: CLINIC | Age: 85
End: 2025-08-19
Payer: MEDICARE

## 2025-08-19 PROCEDURE — ZZZZZ: CPT

## 2025-08-21 LAB
INR PPP: 2.9 RATIO
QUALITY CONTROL: YES

## 2025-08-29 LAB
INR PPP: 3.1 RATIO
QUALITY CONTROL: YES

## 2025-09-04 LAB
INR PPP: 3.2 RATIO
QUALITY CONTROL: YES

## 2025-09-12 LAB
INR PPP: 2.4 RATIO
QUALITY CONTROL: YES

## 2025-09-18 LAB
INR PPP: 2 RATIO
QUALITY CONTROL: YES